# Patient Record
Sex: FEMALE | Race: WHITE
[De-identification: names, ages, dates, MRNs, and addresses within clinical notes are randomized per-mention and may not be internally consistent; named-entity substitution may affect disease eponyms.]

---

## 2022-07-15 ENCOUNTER — HOSPITAL ENCOUNTER (EMERGENCY)
Age: 52
Discharge: HOME | End: 2022-07-15
Payer: MEDICAID

## 2022-07-15 VITALS
HEART RATE: 76 BPM | RESPIRATION RATE: 17 BRPM | DIASTOLIC BLOOD PRESSURE: 96 MMHG | SYSTOLIC BLOOD PRESSURE: 194 MMHG | OXYGEN SATURATION: 98 %

## 2022-07-15 VITALS
RESPIRATION RATE: 18 BRPM | SYSTOLIC BLOOD PRESSURE: 156 MMHG | HEART RATE: 82 BPM | OXYGEN SATURATION: 100 % | DIASTOLIC BLOOD PRESSURE: 79 MMHG

## 2022-07-15 VITALS
SYSTOLIC BLOOD PRESSURE: 156 MMHG | HEART RATE: 83 BPM | RESPIRATION RATE: 15 BRPM | OXYGEN SATURATION: 98 % | DIASTOLIC BLOOD PRESSURE: 84 MMHG

## 2022-07-15 VITALS
OXYGEN SATURATION: 99 % | HEART RATE: 79 BPM | SYSTOLIC BLOOD PRESSURE: 183 MMHG | RESPIRATION RATE: 16 BRPM | DIASTOLIC BLOOD PRESSURE: 97 MMHG

## 2022-07-15 VITALS
DIASTOLIC BLOOD PRESSURE: 74 MMHG | OXYGEN SATURATION: 98 % | HEART RATE: 80 BPM | SYSTOLIC BLOOD PRESSURE: 151 MMHG | RESPIRATION RATE: 16 BRPM

## 2022-07-15 VITALS
TEMPERATURE: 97.88 F | OXYGEN SATURATION: 98 % | DIASTOLIC BLOOD PRESSURE: 74 MMHG | SYSTOLIC BLOOD PRESSURE: 145 MMHG | HEART RATE: 78 BPM | RESPIRATION RATE: 16 BRPM

## 2022-07-15 VITALS
RESPIRATION RATE: 17 BRPM | SYSTOLIC BLOOD PRESSURE: 154 MMHG | DIASTOLIC BLOOD PRESSURE: 79 MMHG | HEART RATE: 88 BPM | TEMPERATURE: 98.8 F | BODY MASS INDEX: 33.3 KG/M2 | OXYGEN SATURATION: 98 %

## 2022-07-15 DIAGNOSIS — Z79.4: ICD-10-CM

## 2022-07-15 DIAGNOSIS — Z91.030: ICD-10-CM

## 2022-07-15 DIAGNOSIS — I69.851: ICD-10-CM

## 2022-07-15 DIAGNOSIS — Z79.01: ICD-10-CM

## 2022-07-15 DIAGNOSIS — R53.1: Primary | ICD-10-CM

## 2022-07-15 DIAGNOSIS — Z79.82: ICD-10-CM

## 2022-07-15 DIAGNOSIS — Z79.899: ICD-10-CM

## 2022-07-15 DIAGNOSIS — Z88.8: ICD-10-CM

## 2022-07-15 LAB
ANION GAP SERPL CALC-SCNC: 10.5 MEQ/L (ref 5–15)
BUN SERPL-MCNC: 16 MG/DL (ref 7–17)
CALCIUM SPEC-MCNC: 9.2 MG/DL (ref 8.4–10.2)
CHLORIDE SPEC-SCNC: 98 MMOL/L (ref 98–107)
CO2 SERPL-SCNC: 32 MMOL/L (ref 22–30)
CREAT BLD-SCNC: 0.5 MG/DL (ref 0.52–1.04)
CREATININE CLEARANCE ESTIMATED: 191 ML/MIN (ref 50–200)
ESTIMATED GLOMERULAR FILT RATE: 130 ML/MIN (ref 60–?)
GFR (AFRICAN AMERICAN): 157 ML/MIN (ref 60–?)
GLUCOSE: 247 MG/DL (ref 74–100)
HCT VFR BLD CALC: 41.3 % (ref 37–47)
HGB BLD-MCNC: 13.3 G/DL (ref 12.2–16.2)
INR PPP: 0.98 (ref 0.9–1.1)
MCHC RBC-ENTMCNC: 32.3 G/DL (ref 31.8–35.4)
MCV RBC: 91.5 FL (ref 81–99)
MEAN CORPUSCULAR HEMOGLOBIN: 29.6 PG (ref 27–31.2)
PLATELET # BLD: 468 K/MM3 (ref 142–424)
POTASSIUM: 4.5 MMOL/L (ref 3.5–5.1)
PT BLD: 11.1 SECONDS (ref 10.1–12.5)
RBC # BLD AUTO: 4.51 M/MM3 (ref 4.2–5.4)
SODIUM SPEC-SCNC: 136 MMOL/L (ref 136–145)
WBC # BLD AUTO: 8.4 K/MM3 (ref 4.8–10.8)

## 2022-07-15 PROCEDURE — 70498 CT ANGIOGRAPHY NECK: CPT

## 2022-07-15 PROCEDURE — 80048 BASIC METABOLIC PNL TOTAL CA: CPT

## 2022-07-15 PROCEDURE — 70450 CT HEAD/BRAIN W/O DYE: CPT

## 2022-07-15 PROCEDURE — 85025 COMPLETE CBC W/AUTO DIFF WBC: CPT

## 2022-07-15 PROCEDURE — 85610 PROTHROMBIN TIME: CPT

## 2022-07-15 PROCEDURE — 70496 CT ANGIOGRAPHY HEAD: CPT

## 2022-07-15 PROCEDURE — 99291 CRITICAL CARE FIRST HOUR: CPT

## 2022-07-15 NOTE — PC.NURSE
1210 PT OFFERED WARM BLANKET, DECLINED. SIDE RAILS UP X2. BED IN LOWEST POSITION. CALL LIGHT WITHIN REACH

## 2022-08-18 ENCOUNTER — HOSPITAL ENCOUNTER (OUTPATIENT)
Dept: HOSPITAL 22 - PT.CARL | Age: 52
LOS: 71 days | Discharge: HOME | End: 2022-10-28
Payer: MEDICAID

## 2022-08-18 DIAGNOSIS — I69.351: Primary | ICD-10-CM

## 2022-08-18 PROCEDURE — 97163 PT EVAL HIGH COMPLEX 45 MIN: CPT

## 2022-08-18 PROCEDURE — 97110 THERAPEUTIC EXERCISES: CPT

## 2022-08-18 PROCEDURE — 97530 THERAPEUTIC ACTIVITIES: CPT

## 2022-08-18 PROCEDURE — 97112 NEUROMUSCULAR REEDUCATION: CPT

## 2022-12-19 ENCOUNTER — HOSPITAL ENCOUNTER (OUTPATIENT)
Dept: HOSPITAL 22 - PT | Age: 52
LOS: 49 days | Discharge: HOME | End: 2023-02-06
Payer: COMMERCIAL

## 2022-12-19 DIAGNOSIS — S82.841A: Primary | ICD-10-CM

## 2022-12-19 PROCEDURE — 97530 THERAPEUTIC ACTIVITIES: CPT

## 2022-12-19 PROCEDURE — 97116 GAIT TRAINING THERAPY: CPT

## 2022-12-19 PROCEDURE — 97112 NEUROMUSCULAR REEDUCATION: CPT

## 2022-12-19 PROCEDURE — 97163 PT EVAL HIGH COMPLEX 45 MIN: CPT

## 2022-12-19 PROCEDURE — 97140 MANUAL THERAPY 1/> REGIONS: CPT

## 2022-12-19 PROCEDURE — 97110 THERAPEUTIC EXERCISES: CPT

## 2023-06-05 ENCOUNTER — APPOINTMENT (OUTPATIENT)
Dept: OTHER | Facility: HOSPITAL | Age: 53
End: 2023-06-05
Payer: MEDICARE

## 2023-06-05 ENCOUNTER — APPOINTMENT (OUTPATIENT)
Dept: CARDIOLOGY | Facility: HOSPITAL | Age: 53
End: 2023-06-05
Payer: MEDICARE

## 2023-06-05 ENCOUNTER — APPOINTMENT (OUTPATIENT)
Dept: MRI IMAGING | Facility: HOSPITAL | Age: 53
End: 2023-06-05
Payer: MEDICARE

## 2023-06-05 ENCOUNTER — HOSPITAL ENCOUNTER (OUTPATIENT)
Facility: HOSPITAL | Age: 53
Setting detail: OBSERVATION
LOS: 1 days | Discharge: NURSING, REHAB OR LONG-TERM CARE | End: 2023-06-08
Attending: INTERNAL MEDICINE | Admitting: HOSPITALIST
Payer: MEDICARE

## 2023-06-05 DIAGNOSIS — I63.9 CEREBROVASCULAR ACCIDENT (CVA), UNSPECIFIED MECHANISM: Primary | ICD-10-CM

## 2023-06-05 DIAGNOSIS — E11.9 TYPE 2 DIABETES MELLITUS WITHOUT COMPLICATION, UNSPECIFIED WHETHER LONG TERM INSULIN USE: ICD-10-CM

## 2023-06-05 DIAGNOSIS — R41.841 COGNITIVE COMMUNICATION DEFICIT: ICD-10-CM

## 2023-06-05 PROBLEM — E78.5 HYPERLIPIDEMIA: Status: ACTIVE | Noted: 2023-06-05

## 2023-06-05 PROBLEM — G81.91 RIGHT HEMIPARESIS: Status: ACTIVE | Noted: 2023-06-05

## 2023-06-05 PROBLEM — Z86.73 HISTORY OF STROKE: Status: ACTIVE | Noted: 2023-06-05

## 2023-06-05 PROBLEM — R47.01 APHASIA: Status: ACTIVE | Noted: 2023-06-05

## 2023-06-05 PROBLEM — I10 HTN (HYPERTENSION): Status: ACTIVE | Noted: 2023-06-05

## 2023-06-05 LAB
ALBUMIN SERPL-MCNC: 3.6 G/DL (ref 3.5–5.2)
ALBUMIN/GLOB SERPL: 1.2 G/DL
ALP SERPL-CCNC: 138 U/L (ref 39–117)
ALT SERPL W P-5'-P-CCNC: 16 U/L (ref 1–33)
ANION GAP SERPL CALCULATED.3IONS-SCNC: 12 MMOL/L (ref 5–15)
AST SERPL-CCNC: 17 U/L (ref 1–32)
BASOPHILS # BLD AUTO: 0.05 10*3/MM3 (ref 0–0.2)
BASOPHILS NFR BLD AUTO: 0.7 % (ref 0–1.5)
BH CV ECHO MEAS - AO MAX PG: 8.2 MMHG
BH CV ECHO MEAS - AO MEAN PG: 5 MMHG
BH CV ECHO MEAS - AO ROOT DIAM: 2.9 CM
BH CV ECHO MEAS - AO V2 MAX: 143 CM/SEC
BH CV ECHO MEAS - AO V2 VTI: 35.8 CM
BH CV ECHO MEAS - AVA(I,D): 2.19 CM2
BH CV ECHO MEAS - EDV(CUBED): 54.9 ML
BH CV ECHO MEAS - EDV(MOD-SP2): 107 ML
BH CV ECHO MEAS - EDV(MOD-SP4): 97.7 ML
BH CV ECHO MEAS - EF(MOD-BP): 64 %
BH CV ECHO MEAS - EF(MOD-SP2): 60.9 %
BH CV ECHO MEAS - EF(MOD-SP4): 64.4 %
BH CV ECHO MEAS - ESV(CUBED): 17.6 ML
BH CV ECHO MEAS - ESV(MOD-SP2): 41.8 ML
BH CV ECHO MEAS - ESV(MOD-SP4): 34.8 ML
BH CV ECHO MEAS - FS: 31.6 %
BH CV ECHO MEAS - IVS/LVPW: 1 CM
BH CV ECHO MEAS - IVSD: 1 CM
BH CV ECHO MEAS - LA DIMENSION: 3.5 CM
BH CV ECHO MEAS - LAT PEAK E' VEL: 12.4 CM/SEC
BH CV ECHO MEAS - LV MASS(C)D: 117.3 GRAMS
BH CV ECHO MEAS - LV MAX PG: 4.2 MMHG
BH CV ECHO MEAS - LV MEAN PG: 2 MMHG
BH CV ECHO MEAS - LV V1 MAX: 103 CM/SEC
BH CV ECHO MEAS - LV V1 VTI: 25 CM
BH CV ECHO MEAS - LVIDD: 3.8 CM
BH CV ECHO MEAS - LVIDS: 2.6 CM
BH CV ECHO MEAS - LVOT AREA: 3.1 CM2
BH CV ECHO MEAS - LVOT DIAM: 2 CM
BH CV ECHO MEAS - LVPWD: 1 CM
BH CV ECHO MEAS - MED PEAK E' VEL: 6.6 CM/SEC
BH CV ECHO MEAS - MV A MAX VEL: 98 CM/SEC
BH CV ECHO MEAS - MV DEC TIME: 0.24 MSEC
BH CV ECHO MEAS - MV E MAX VEL: 108 CM/SEC
BH CV ECHO MEAS - MV E/A: 1.1
BH CV ECHO MEAS - MV MAX PG: 4.5 MMHG
BH CV ECHO MEAS - MV MEAN PG: 3 MMHG
BH CV ECHO MEAS - MV V2 VTI: 33 CM
BH CV ECHO MEAS - MVA(VTI): 2.38 CM2
BH CV ECHO MEAS - PA ACC TIME: 0.18 SEC
BH CV ECHO MEAS - PA PR(ACCEL): -0.2 MMHG
BH CV ECHO MEAS - PA V2 MAX: 114 CM/SEC
BH CV ECHO MEAS - SV(LVOT): 78.5 ML
BH CV ECHO MEAS - SV(MOD-SP2): 65.2 ML
BH CV ECHO MEAS - SV(MOD-SP4): 62.9 ML
BH CV ECHO MEAS - TAPSE (>1.6): 1.88 CM
BH CV ECHO MEASUREMENTS AVERAGE E/E' RATIO: 11.37
BH CV ECHO SHUNT ASSESSMENT PERFORMED (HIDDEN SCRIPTING): 1
BH CV VAS BP LEFT ARM: NORMAL MMHG
BH CV XLRA - RV BASE: 3.1 CM
BH CV XLRA - RV LENGTH: 5.9 CM
BH CV XLRA - RV MID: 2.5 CM
BH CV XLRA - TDI S': 12.7 CM/SEC
BILIRUB SERPL-MCNC: 0.3 MG/DL (ref 0–1.2)
BUN SERPL-MCNC: 15 MG/DL (ref 6–20)
BUN/CREAT SERPL: 26.3 (ref 7–25)
CALCIUM SPEC-SCNC: 9 MG/DL (ref 8.6–10.5)
CHLORIDE SERPL-SCNC: 103 MMOL/L (ref 98–107)
CHOLEST SERPL-MCNC: 138 MG/DL (ref 0–200)
CO2 SERPL-SCNC: 24 MMOL/L (ref 22–29)
CREAT SERPL-MCNC: 0.57 MG/DL (ref 0.57–1)
DEPRECATED RDW RBC AUTO: 46.8 FL (ref 37–54)
EGFRCR SERPLBLD CKD-EPI 2021: 109.5 ML/MIN/1.73
EOSINOPHIL # BLD AUTO: 0.19 10*3/MM3 (ref 0–0.4)
EOSINOPHIL NFR BLD AUTO: 2.5 % (ref 0.3–6.2)
ERYTHROCYTE [DISTWIDTH] IN BLOOD BY AUTOMATED COUNT: 13.7 % (ref 12.3–15.4)
GLOBULIN UR ELPH-MCNC: 3 GM/DL
GLUCOSE BLDC GLUCOMTR-MCNC: 121 MG/DL (ref 70–130)
GLUCOSE BLDC GLUCOMTR-MCNC: 129 MG/DL (ref 70–130)
GLUCOSE BLDC GLUCOMTR-MCNC: 145 MG/DL (ref 70–130)
GLUCOSE BLDC GLUCOMTR-MCNC: 154 MG/DL (ref 70–130)
GLUCOSE SERPL-MCNC: 118 MG/DL (ref 65–99)
HBA1C MFR BLD: 10.8 % (ref 4.8–5.6)
HCT VFR BLD AUTO: 36.6 % (ref 34–46.6)
HDLC SERPL-MCNC: 62 MG/DL (ref 40–60)
HGB BLD-MCNC: 11.6 G/DL (ref 12–15.9)
IMM GRANULOCYTES # BLD AUTO: 0.02 10*3/MM3 (ref 0–0.05)
IMM GRANULOCYTES NFR BLD AUTO: 0.3 % (ref 0–0.5)
LDLC SERPL CALC-MCNC: 62 MG/DL (ref 0–100)
LDLC/HDLC SERPL: 1.01 {RATIO}
LEFT ATRIUM VOLUME INDEX: 32.2 ML/M2
LYMPHOCYTES # BLD AUTO: 2.97 10*3/MM3 (ref 0.7–3.1)
LYMPHOCYTES NFR BLD AUTO: 39.4 % (ref 19.6–45.3)
MAGNESIUM SERPL-MCNC: 1.9 MG/DL (ref 1.6–2.6)
MCH RBC QN AUTO: 29.1 PG (ref 26.6–33)
MCHC RBC AUTO-ENTMCNC: 31.7 G/DL (ref 31.5–35.7)
MCV RBC AUTO: 92 FL (ref 79–97)
MONOCYTES # BLD AUTO: 0.58 10*3/MM3 (ref 0.1–0.9)
MONOCYTES NFR BLD AUTO: 7.7 % (ref 5–12)
NEUTROPHILS NFR BLD AUTO: 3.73 10*3/MM3 (ref 1.7–7)
NEUTROPHILS NFR BLD AUTO: 49.4 % (ref 42.7–76)
NRBC BLD AUTO-RTO: 0 /100 WBC (ref 0–0.2)
PA ADP PRP-ACNC: 17 PRU
PLATELET # BLD AUTO: 318 10*3/MM3 (ref 140–450)
PMV BLD AUTO: 10.2 FL (ref 6–12)
POTASSIUM SERPL-SCNC: 3 MMOL/L (ref 3.5–5.2)
POTASSIUM SERPL-SCNC: 4.4 MMOL/L (ref 3.5–5.2)
PROT SERPL-MCNC: 6.6 G/DL (ref 6–8.5)
RBC # BLD AUTO: 3.98 10*6/MM3 (ref 3.77–5.28)
SODIUM SERPL-SCNC: 139 MMOL/L (ref 136–145)
TRIGL SERPL-MCNC: 67 MG/DL (ref 0–150)
VLDLC SERPL-MCNC: 14 MG/DL (ref 5–40)
WBC NRBC COR # BLD: 7.54 10*3/MM3 (ref 3.4–10.8)

## 2023-06-05 PROCEDURE — 85025 COMPLETE CBC W/AUTO DIFF WBC: CPT | Performed by: INTERNAL MEDICINE

## 2023-06-05 PROCEDURE — G0378 HOSPITAL OBSERVATION PER HR: HCPCS

## 2023-06-05 PROCEDURE — 99214 OFFICE O/P EST MOD 30 MIN: CPT | Performed by: NURSE PRACTITIONER

## 2023-06-05 PROCEDURE — 84132 ASSAY OF SERUM POTASSIUM: CPT | Performed by: INTERNAL MEDICINE

## 2023-06-05 PROCEDURE — 83036 HEMOGLOBIN GLYCOSYLATED A1C: CPT | Performed by: INTERNAL MEDICINE

## 2023-06-05 PROCEDURE — 97530 THERAPEUTIC ACTIVITIES: CPT

## 2023-06-05 PROCEDURE — 96372 THER/PROPH/DIAG INJ SC/IM: CPT

## 2023-06-05 PROCEDURE — 92523 SPEECH SOUND LANG COMPREHEN: CPT

## 2023-06-05 PROCEDURE — 25010000002 ENOXAPARIN PER 10 MG: Performed by: INTERNAL MEDICINE

## 2023-06-05 PROCEDURE — 97535 SELF CARE MNGMENT TRAINING: CPT

## 2023-06-05 PROCEDURE — 97116 GAIT TRAINING THERAPY: CPT

## 2023-06-05 PROCEDURE — 70551 MRI BRAIN STEM W/O DYE: CPT

## 2023-06-05 PROCEDURE — 93306 TTE W/DOPPLER COMPLETE: CPT

## 2023-06-05 PROCEDURE — 92610 EVALUATE SWALLOWING FUNCTION: CPT

## 2023-06-05 PROCEDURE — 80061 LIPID PANEL: CPT | Performed by: INTERNAL MEDICINE

## 2023-06-05 PROCEDURE — 83735 ASSAY OF MAGNESIUM: CPT | Performed by: INTERNAL MEDICINE

## 2023-06-05 PROCEDURE — 82948 REAGENT STRIP/BLOOD GLUCOSE: CPT

## 2023-06-05 PROCEDURE — G0379 DIRECT REFER HOSPITAL OBSERV: HCPCS

## 2023-06-05 PROCEDURE — 80053 COMPREHEN METABOLIC PANEL: CPT | Performed by: INTERNAL MEDICINE

## 2023-06-05 PROCEDURE — 96365 THER/PROPH/DIAG IV INF INIT: CPT

## 2023-06-05 PROCEDURE — 97162 PT EVAL MOD COMPLEX 30 MIN: CPT

## 2023-06-05 PROCEDURE — 85576 BLOOD PLATELET AGGREGATION: CPT | Performed by: INTERNAL MEDICINE

## 2023-06-05 PROCEDURE — 63710000001 INSULIN LISPRO (HUMAN) PER 5 UNITS: Performed by: INTERNAL MEDICINE

## 2023-06-05 PROCEDURE — 97165 OT EVAL LOW COMPLEX 30 MIN: CPT

## 2023-06-05 PROCEDURE — 63710000001 INSULIN DETEMIR PER 5 UNITS: Performed by: INTERNAL MEDICINE

## 2023-06-05 PROCEDURE — 99223 1ST HOSP IP/OBS HIGH 75: CPT | Performed by: INTERNAL MEDICINE

## 2023-06-05 PROCEDURE — 93306 TTE W/DOPPLER COMPLETE: CPT | Performed by: INTERNAL MEDICINE

## 2023-06-05 RX ORDER — AMOXICILLIN 250 MG
2 CAPSULE ORAL 2 TIMES DAILY
Status: DISCONTINUED | OUTPATIENT
Start: 2023-06-05 | End: 2023-06-08 | Stop reason: HOSPADM

## 2023-06-05 RX ORDER — PREGABALIN 150 MG/1
300 CAPSULE ORAL NIGHTLY PRN
Status: DISCONTINUED | OUTPATIENT
Start: 2023-06-05 | End: 2023-06-08 | Stop reason: HOSPADM

## 2023-06-05 RX ORDER — ONDANSETRON 2 MG/ML
4 INJECTION INTRAMUSCULAR; INTRAVENOUS EVERY 6 HOURS PRN
Status: DISCONTINUED | OUTPATIENT
Start: 2023-06-05 | End: 2023-06-08 | Stop reason: HOSPADM

## 2023-06-05 RX ORDER — NITROGLYCERIN 0.4 MG/1
0.4 TABLET SUBLINGUAL
Status: DISCONTINUED | OUTPATIENT
Start: 2023-06-05 | End: 2023-06-05

## 2023-06-05 RX ORDER — OMEPRAZOLE 20 MG/1
20 CAPSULE, DELAYED RELEASE ORAL DAILY
COMMUNITY
End: 2023-06-08 | Stop reason: HOSPADM

## 2023-06-05 RX ORDER — BUPROPION HYDROCHLORIDE 150 MG/1
150 TABLET ORAL DAILY
Status: DISCONTINUED | OUTPATIENT
Start: 2023-06-05 | End: 2023-06-08 | Stop reason: HOSPADM

## 2023-06-05 RX ORDER — BUPROPION HYDROCHLORIDE 150 MG/1
150 TABLET ORAL DAILY
COMMUNITY

## 2023-06-05 RX ORDER — PREGABALIN 100 MG/1
300 CAPSULE ORAL
Status: ON HOLD | COMMUNITY
End: 2023-06-08 | Stop reason: SDUPTHER

## 2023-06-05 RX ORDER — SODIUM CHLORIDE 0.9 % (FLUSH) 0.9 %
10 SYRINGE (ML) INJECTION AS NEEDED
Status: DISCONTINUED | OUTPATIENT
Start: 2023-06-05 | End: 2023-06-08 | Stop reason: HOSPADM

## 2023-06-05 RX ORDER — IBUPROFEN 600 MG/1
1 TABLET ORAL
Status: DISCONTINUED | OUTPATIENT
Start: 2023-06-05 | End: 2023-06-08 | Stop reason: HOSPADM

## 2023-06-05 RX ORDER — SODIUM CHLORIDE 0.9 % (FLUSH) 0.9 %
10 SYRINGE (ML) INJECTION EVERY 12 HOURS SCHEDULED
Status: DISCONTINUED | OUTPATIENT
Start: 2023-06-05 | End: 2023-06-08 | Stop reason: HOSPADM

## 2023-06-05 RX ORDER — CLOPIDOGREL BISULFATE 75 MG/1
75 TABLET ORAL DAILY
COMMUNITY
End: 2023-06-08 | Stop reason: HOSPADM

## 2023-06-05 RX ORDER — POTASSIUM CHLORIDE 1.5 G/1.77G
40 POWDER, FOR SOLUTION ORAL EVERY 4 HOURS
Status: COMPLETED | OUTPATIENT
Start: 2023-06-05 | End: 2023-06-05

## 2023-06-05 RX ORDER — ASPIRIN 300 MG/1
300 SUPPOSITORY RECTAL DAILY
Status: DISCONTINUED | OUTPATIENT
Start: 2023-06-05 | End: 2023-06-06 | Stop reason: ALTCHOICE

## 2023-06-05 RX ORDER — NICOTINE POLACRILEX 4 MG
15 LOZENGE BUCCAL
Status: DISCONTINUED | OUTPATIENT
Start: 2023-06-05 | End: 2023-06-08 | Stop reason: HOSPADM

## 2023-06-05 RX ORDER — INSULIN DEGLUDEC 100 U/ML
60 INJECTION, SOLUTION SUBCUTANEOUS
COMMUNITY

## 2023-06-05 RX ORDER — BISACODYL 5 MG/1
5 TABLET, DELAYED RELEASE ORAL DAILY PRN
Status: DISCONTINUED | OUTPATIENT
Start: 2023-06-05 | End: 2023-06-08 | Stop reason: HOSPADM

## 2023-06-05 RX ORDER — PANTOPRAZOLE SODIUM 40 MG/1
40 TABLET, DELAYED RELEASE ORAL
Status: DISCONTINUED | OUTPATIENT
Start: 2023-06-05 | End: 2023-06-08 | Stop reason: HOSPADM

## 2023-06-05 RX ORDER — ASPIRIN 81 MG/1
81 TABLET, CHEWABLE ORAL DAILY
Status: DISCONTINUED | OUTPATIENT
Start: 2023-06-05 | End: 2023-06-08 | Stop reason: HOSPADM

## 2023-06-05 RX ORDER — ASPIRIN 81 MG/1
81 TABLET, CHEWABLE ORAL DAILY
Status: DISCONTINUED | OUTPATIENT
Start: 2023-06-05 | End: 2023-06-05

## 2023-06-05 RX ORDER — FLUOXETINE HYDROCHLORIDE 20 MG/1
40 CAPSULE ORAL DAILY
COMMUNITY
End: 2023-06-08 | Stop reason: HOSPADM

## 2023-06-05 RX ORDER — BISACODYL 10 MG
10 SUPPOSITORY, RECTAL RECTAL DAILY PRN
Status: DISCONTINUED | OUTPATIENT
Start: 2023-06-05 | End: 2023-06-08 | Stop reason: HOSPADM

## 2023-06-05 RX ORDER — CYCLOBENZAPRINE HCL 10 MG
10 TABLET ORAL DAILY PRN
Status: DISCONTINUED | OUTPATIENT
Start: 2023-06-05 | End: 2023-06-08 | Stop reason: HOSPADM

## 2023-06-05 RX ORDER — SODIUM CHLORIDE 9 MG/ML
40 INJECTION, SOLUTION INTRAVENOUS AS NEEDED
Status: DISCONTINUED | OUTPATIENT
Start: 2023-06-05 | End: 2023-06-08 | Stop reason: HOSPADM

## 2023-06-05 RX ORDER — ATORVASTATIN CALCIUM 80 MG/1
80 TABLET, FILM COATED ORAL DAILY
COMMUNITY

## 2023-06-05 RX ORDER — ASPIRIN 325 MG
TABLET ORAL
Status: DISCONTINUED
Start: 2023-06-05 | End: 2023-06-08 | Stop reason: HOSPADM

## 2023-06-05 RX ORDER — ASPIRIN 81 MG/1
81 TABLET, CHEWABLE ORAL DAILY
Status: ON HOLD | COMMUNITY
End: 2023-06-08 | Stop reason: SDUPTHER

## 2023-06-05 RX ORDER — SODIUM CHLORIDE 9 MG/ML
40 INJECTION, SOLUTION INTRAVENOUS AS NEEDED
Status: DISCONTINUED | OUTPATIENT
Start: 2023-06-05 | End: 2023-06-05

## 2023-06-05 RX ORDER — LOSARTAN POTASSIUM 50 MG/1
100 TABLET ORAL DAILY
COMMUNITY

## 2023-06-05 RX ORDER — POLYETHYLENE GLYCOL 3350 17 G/17G
17 POWDER, FOR SOLUTION ORAL DAILY PRN
Status: DISCONTINUED | OUTPATIENT
Start: 2023-06-05 | End: 2023-06-08 | Stop reason: HOSPADM

## 2023-06-05 RX ORDER — CLOPIDOGREL BISULFATE 75 MG/1
75 TABLET ORAL DAILY
Status: DISCONTINUED | OUTPATIENT
Start: 2023-06-05 | End: 2023-06-05

## 2023-06-05 RX ORDER — INSULIN LISPRO 100 [IU]/ML
2-9 INJECTION, SOLUTION INTRAVENOUS; SUBCUTANEOUS
Status: DISCONTINUED | OUTPATIENT
Start: 2023-06-05 | End: 2023-06-08 | Stop reason: HOSPADM

## 2023-06-05 RX ORDER — ATORVASTATIN CALCIUM 40 MG/1
80 TABLET, FILM COATED ORAL NIGHTLY
Status: DISCONTINUED | OUTPATIENT
Start: 2023-06-05 | End: 2023-06-08 | Stop reason: HOSPADM

## 2023-06-05 RX ORDER — ONDANSETRON 4 MG/1
4 TABLET, FILM COATED ORAL EVERY 6 HOURS PRN
COMMUNITY

## 2023-06-05 RX ORDER — CYCLOBENZAPRINE HCL 10 MG
10 TABLET ORAL EVERY MORNING
Status: ON HOLD | COMMUNITY
End: 2023-06-08 | Stop reason: SDUPTHER

## 2023-06-05 RX ORDER — SODIUM CHLORIDE 0.9 % (FLUSH) 0.9 %
10 SYRINGE (ML) INJECTION AS NEEDED
Status: DISCONTINUED | OUTPATIENT
Start: 2023-06-05 | End: 2023-06-05

## 2023-06-05 RX ORDER — NITROGLYCERIN 0.4 MG/1
0.4 TABLET SUBLINGUAL
Status: CANCELLED | OUTPATIENT
Start: 2023-06-05

## 2023-06-05 RX ORDER — ASPIRIN 325 MG
325 TABLET ORAL DAILY
Status: DISCONTINUED | OUTPATIENT
Start: 2023-06-05 | End: 2023-06-05

## 2023-06-05 RX ORDER — SODIUM CHLORIDE 0.9 % (FLUSH) 0.9 %
10 SYRINGE (ML) INJECTION EVERY 12 HOURS SCHEDULED
Status: DISCONTINUED | OUTPATIENT
Start: 2023-06-05 | End: 2023-06-05

## 2023-06-05 RX ORDER — ENOXAPARIN SODIUM 100 MG/ML
40 INJECTION SUBCUTANEOUS DAILY
Status: DISCONTINUED | OUTPATIENT
Start: 2023-06-05 | End: 2023-06-08 | Stop reason: HOSPADM

## 2023-06-05 RX ORDER — DEXTROSE MONOHYDRATE 25 G/50ML
25 INJECTION, SOLUTION INTRAVENOUS
Status: DISCONTINUED | OUTPATIENT
Start: 2023-06-05 | End: 2023-06-08 | Stop reason: HOSPADM

## 2023-06-05 RX ADMIN — POTASSIUM CHLORIDE 40 MEQ: 1.5 POWDER, FOR SOLUTION ORAL at 10:13

## 2023-06-05 RX ADMIN — ATORVASTATIN CALCIUM 80 MG: 40 TABLET, FILM COATED ORAL at 22:02

## 2023-06-05 RX ADMIN — ASPIRIN 81 MG CHEWABLE TABLET 81 MG: 81 TABLET CHEWABLE at 15:43

## 2023-06-05 RX ADMIN — ENOXAPARIN SODIUM 40 MG: 100 INJECTION SUBCUTANEOUS at 09:34

## 2023-06-05 RX ADMIN — TICAGRELOR 60 MG: 60 TABLET ORAL at 22:07

## 2023-06-05 RX ADMIN — POTASSIUM CHLORIDE 40 MEQ: 1.5 POWDER, FOR SOLUTION ORAL at 17:35

## 2023-06-05 RX ADMIN — BUPROPION HYDROCHLORIDE 150 MG: 150 TABLET, EXTENDED RELEASE ORAL at 09:34

## 2023-06-05 RX ADMIN — NICARDIPINE HYDROCHLORIDE 5 MG/HR: 25 INJECTION, SOLUTION INTRAVENOUS at 09:06

## 2023-06-05 RX ADMIN — POTASSIUM CHLORIDE 40 MEQ: 1.5 POWDER, FOR SOLUTION ORAL at 14:10

## 2023-06-05 RX ADMIN — Medication 10 ML: at 09:35

## 2023-06-05 RX ADMIN — INSULIN LISPRO 2 UNITS: 100 INJECTION, SOLUTION INTRAVENOUS; SUBCUTANEOUS at 17:34

## 2023-06-05 RX ADMIN — PANTOPRAZOLE SODIUM 40 MG: 40 TABLET, DELAYED RELEASE ORAL at 09:34

## 2023-06-05 RX ADMIN — INSULIN DETEMIR 10 UNITS: 100 INJECTION, SOLUTION SUBCUTANEOUS at 09:35

## 2023-06-05 RX ADMIN — EMPAGLIFLOZIN 10 MG: 10 TABLET, FILM COATED ORAL at 09:34

## 2023-06-05 RX ADMIN — SENNOSIDES AND DOCUSATE SODIUM 2 TABLET: 50; 8.6 TABLET ORAL at 22:02

## 2023-06-05 NOTE — PLAN OF CARE
Goal Outcome Evaluation:  Plan of Care Reviewed With: patient           Outcome Evaluation: PT IS  S/P NEWLY DIAGNOSED SA CVA  AND PRESENTS WITH EVOLVING SYMPTOMS TO INCLUDE INCREASED R SIDED WEAKNESS, IMPAIRED BALANCE, IMPAIRED COORDINATION AND DECLINE IN FUNCTIONAL MOBILITY. PT CURRENTLY REQUIRES MOD ASSIST OF 2 VIA B UE SUPPORT TO AMBULATE 28 FEET. PT IS A&O AND FOLLOWS ALL COMMANDS. RECOMMEND IRF FOR BEST OUTCOME. PT IS HIGHLY MOTIVATED TO WORK WITH THERAPY AND ENDORSES GOAL TO RETURN TO INDEPENDENT AMBULATION WITH CHERELLE WX.

## 2023-06-05 NOTE — CONSULTS
Diabetes Education  Assessment/Teaching    Patient Name:  Alma Mederos  YOB: 1970  MRN: [id number]  Admit Date:  6/5/2023      Assessment Date:  6/5/2023    Chart reviewed for diabetes ed opportunity. Noted most recent GCS is 11. Will cont to follow and reassess for appropriate time for education.              Electronically signed by:  Sobia Bridges RN  06/05/23 10:46 EDT

## 2023-06-05 NOTE — THERAPY EVALUATION
Acute Care - Speech Language Pathology Initial Evaluation  [facility]  Cognitive-Communication Evaluation  & Clinical Swallow Evaluation     Patient Name: Alma Mederos  : 1970  MRN: [id number]  Today's Date: 2023               Admit Date: 2023     Visit Dx:    ICD-10-CM ICD-9-CM   1. Cerebrovascular accident (CVA), unspecified mechanism  I63.9 434.91   2. Cognitive communication deficit  R41.841 799.52     Patient Active Problem List   Diagnosis    Right hemiparesis    Aphasia    History of stroke    CVA (cerebral vascular accident)    DM (diabetes mellitus)    HTN (hypertension)    Hyperlipidemia    Stroke     Past Medical History:   Diagnosis Date    Diabetes mellitus     Hypertension     Stroke      Past Surgical History:   Procedure Laterality Date    APPENDECTOMY         SLP Recommendation and Plan  SLP Diagnosis: mild, cognitive-linguistic disorder (23)           Swallow Criteria for Skilled Therapeutic Interventions Met: no problems identified which require skilled intervention (23 1000)  SLC Criteria for Skilled Therapy Interventions Met: yes (23)  Anticipated Discharge Disposition (SLP): anticipate therapy at next level of care, home with home health, home with assist (assist w/ medication/finance mgt) (23)        Predicted Duration Therapy Intervention (Days): until discharge (23)                          Plan of Care Reviewed With: patient (23)  Progress: no change (23)      SLP EVALUATION (last 72 hours)       SLP SLC Evaluation       Row Name 23                   Communication Assessment/Intervention    Document Type evaluation  -AC        Subjective Information no complaints  -AC        Patient Observations alert;cooperative  -AC        Patient/Family/Caregiver Comments/Observations No family present.  -AC        Patient Effort good  -AC           General Information    Patient Profile Reviewed yes   -AC        Pertinent History Of Current Problem Adm when developed worsened R hemiparesis, aphasia. Hx L thalamic CVA ~1yr ago w/ residual R deficits. Saw SLP for dysarthria tx, but resolved. Now reported acute word finding difficulty. MRI: possible subacute L corona radiata infarct. Hx L retinal detachment r/t DM2.  -AC        Precautions/Limitations, Vision vision impairment, left;other (see comments)  uses larger print/magnifying glass  -AC        Precautions/Limitations, Hearing WFL;for purposes of eval  -AC        Patient Level of Education Some college  -        Prior Level of Function-Communication WFL  -AC        Plans/Goals Discussed with patient;agreed upon  -AC        Barriers to Rehab none identified  -        Patient's Goals for Discharge return to all previous roles/activities;functional communication  -AC           Pain    Additional Documentation Pain Scale: Numbers Pre/Post-Treatment (Group)  -           Pain Scale: Numbers Pre/Post-Treatment    Pretreatment Pain Rating 0/10 - no pain  -AC        Posttreatment Pain Rating 0/10 - no pain  -AC           Comprehension Assessment/Intervention    Comprehension Assessment/Intervention Auditory Comprehension;Reading Comprehension  -AC           Auditory Comprehension Assessment/Intervention    Auditory Comprehension (Communication) WFL  -AC        Answers Questions (Communication) WFL;complex;yes/no  -AC        Able to Follow Commands (Communication) WFL;3-step  -AC        Narrative Discourse WFL;conversational level  -AC           Reading Comprehension Assessment/Intervention    Reading Comprehension (Communication) WFL  -AC        Phrase Level WFL  -AC           Expression Assessment/Intervention    Expression Assessment/Intervention verbal expression;graphic expression  -AC           Verbal Expression Assessment/Intervention    Verbal Expression WFL  -AC        Automatic Speech (Communication) WFL;response to greeting;months of year  -AC         Repetition WFL;sentences  -AC        Phrase Completion WFL;unpredictable  -AC        Responsive Naming WFL;simple  -AC        Confrontational Naming WFL;low frequency  -AC        Conversational Discourse/Fluency WFL  -AC           Graphic Expression Assessment/Intervention    Graphic Expression WFL;non-dominant hand;other (see comments)  typing on smart phone  -AC        Sentence Formulation WFL;complex  -AC           Oral Motor Structure and Function    Dentition Assessment missing teeth  -AC        Mucosal Quality dry  -AC           Oral Musculature and Cranial Nerve Assessment    Oral Motor General Assessment oral labial or buccal impairment  -AC        Oral Labial or Buccal Impairment, Detail, Cranial Nerve VII (Facial): right labial droop;other (see comments)  slight  -AC           Motor Speech Assessment/Intervention    Motor Speech Function WFL;unfamiliar listener  -AC        Conversational Speech (Communication) WFL;simple  -AC        Speech intelligibility 100%;in quiet environment;in connected speech;with unfamiliar listener  -AC           Cognitive Assessment Intervention- SLP    Cognitive Function (Cognition) mild impairment  -        Orientation Status (Cognition) WFL;person;place;time;situation  -        Memory (Cognitive) mild impairment;short-term;delayed;unrelated;other (see comments)  immediate: 5/5 words, 7-min delay: 2/5 words (5/5 w/ min cue--close to baseline per pt)  -AC        Attention (Cognitive) WFL;sustained;divided  -AC        Thought Organization (Cognitive) mild impairment;abstract divergent  -        Reasoning (Cognitive) WFL;analogies;deductive  -AC        Problem Solving (Cognitive) WFL;temporal  -AC        Functional Math (Cognitive) mild impairment;money calculation;word problems  -           SLP Evaluation Clinical Impressions    SLP Diagnosis mild;cognitive-linguistic disorder  -AC        Rehab Potential/Prognosis good  -[facility] Criteria for Skilled Therapy  Interventions Met yes  -AC        Functional Impact difficulty completing home management task  -AC           Recommendations    Therapy Frequency (SLP SLC) 5 days per week  -AC        Predicted Duration Therapy Intervention (Days) until discharge  -AC        Anticipated Discharge Disposition (SLP) anticipate therapy at next level of care;home with home health;home with assist  assist w/ medication/finance mgt  -AC                  User Key  (r) = Recorded By, (t) = Taken By, (c) = Cosigned By      Initials Name Effective Dates    Charleen Courtney MS CCC-SLP 02/03/23 -                        EDUCATION  The patient has been educated in the following areas:     Cognitive Impairment Communication Impairment.           SLP GOALS       Row Name 06/05/23 0945             Patient will demonstrate functional cognitive-linguistic skills for return to discharge environment    [place] Independently  -AC      Time frame by discharge  -AC         Memory Skills Goal 1 (SLP)    Improve Memory Skills Through Goal 1 (SLP) recalling unrelated word lists with an imposed delay;use memory strategies;80%;with minimal cues (75-90%)  -AC      Time Frame (Memory Skills Goal 1, SLP) short term goal (STG)  -AC         Organizational Skills Goal 1 (SLP)    Improve Thought Organization Through Goal 1 (SLP) completing a divergent naming task;abstract;90%;with minimal cues (75-90%)  -AC      Time Frame (Thought Organization Skills Goal 1, SLP) short term goal (STG)  -AC         Functional Math Skills Goal 1 (SLP)    Improve Functional Math Skills Through Goal 1 (SLP) complete word problems involving time;complete word problems involving money;90%;with minimal cues (75-90%)  -AC      Time Frame (Functional Math Skills Goal 1, SLP) short term goal (STG)  -AC                User Key  (r) = Recorded By, (t) = Taken By, (c) = Cosigned By      Initials Name Provider Type    Charleen Courtney MS CCC-SLP Speech and Language Pathologist                             Time Calculation:      Time Calculation- SLP       Row Name 23 1116             Time Calculation- SLP    SLP Start Time 0945  -AC      SLP Received On 23  -AC         Untimed Charges    53601-VI Eval Speech and Production w/ Language Minutes 40  -AC      77358-ZZ Eval Oral Pharyng Swallow Minutes 35  -AC         Total Minutes    Untimed Charges Total Minutes 75  -AC       Total Minutes 75  -AC                User Key  (r) = Recorded By, (t) = Taken By, (c) = Cosigned By      Initials Name Provider Type    AC Charleen Ball, MS CCC-SLP Speech and Language Pathologist                    Therapy Charges for Today       Code Description Service Date Service Provider Modifiers Qty    44656398979 HC ST EVAL ORAL PHARYNG SWALLOW 2 2023 Charleen Ball, MS CCC-SLP GN 1    93189655217 HC ST EVAL SPEECH AND PROD W LANG  3 2023 Charleen Ball, MS CCC-SLP GN 1                       Charleen Ball MS CCC-SLP  2023   and Acute Care - Speech Language Pathology   Swallow Initial Evaluation [facility]     Patient Name: Alma Mederos  : 1970  MRN: [id number]  Today's Date: 2023               Admit Date: 2023    Visit Dx:     ICD-10-CM ICD-9-CM   1. Cerebrovascular accident (CVA), unspecified mechanism  I63.9 434.91   2. Cognitive communication deficit  R41.841 799.52     Patient Active Problem List   Diagnosis    Right hemiparesis    Aphasia    History of stroke    CVA (cerebral vascular accident)    DM (diabetes mellitus)    HTN (hypertension)    Hyperlipidemia    Stroke     Past Medical History:   Diagnosis Date    Diabetes mellitus     Hypertension     Stroke      Past Surgical History:   Procedure Laterality Date    APPENDECTOMY         SLP Recommendation and Plan  SLP Swallowing Diagnosis: swallow WFL/no suspected pharyngeal impairment (23 1000)  SLP Diet Recommendation: soft to chew textures, whole, thin liquids, other (see comments) (softer tray for improved  ease w/ self-feeding (cuts food w/ fork, rather than knife, due to R hand weakness)) (06/05/23 1000)  Recommended Precautions and Strategies: upright posture during/after eating, general aspiration precautions, other (see comments) (assist w/ tray set-up) (06/05/23 1000)  SLP Rec. for Method of Medication Administration: meds whole, with thin liquids, as tolerated (06/05/23 1000)           Swallow Criteria for Skilled Therapeutic Interventions Met: no problems identified which require skilled intervention (06/05/23 1000)  Anticipated Discharge Disposition (SLP): anticipate therapy at next level of care, home with home health, home with assist (assist w/ medication/finance mgt) (06/05/23 0945)        Predicted Duration Therapy Intervention (Days): until discharge (06/05/23 0945)                                        Plan of Care Reviewed With: patient  Progress: no change      SWALLOW EVALUATION (last 72 hours)       SLP Adult Swallow Evaluation       Row Name 06/05/23 1000                   Rehab Evaluation    Document Type evaluation  -AC        Patient Effort good  -AC           General Information    Patient Profile Reviewed yes  -AC        Current Method of Nutrition NPO  -AC        Prior Level of Function-Swallowing no diet consistency restrictions  -AC        Plans/Goals Discussed with patient;agreed upon  -AC        Barriers to Rehab none identified  -AC        Patient's Goals for Discharge return to PO diet  -AC           Pain Scale: Numbers Pre/Post-Treatment    Pretreatment Pain Rating 0/10 - no pain  -AC        Posttreatment Pain Rating 0/10 - no pain  -AC           Oral Motor Structure and Function    Secretion Management WNL/WFL  -AC        Volitional Cough WFL  -AC           General Eating/Swallowing Observations    Respiratory Support Currently in Use room air  -AC        Eating/Swallowing Skills fed by SLP;self-fed;needed assist;other (see comments)  needed assist w/ set-up 2' limited use of R hand   -        Positioning During Eating upright 90 degree;upright in bed  -        Utensils Used spoon;cup;straw  -        Consistencies Trialed thin liquids;pureed;regular textures  -           Clinical Swallow Eval    Oral Prep Phase WFL  -AC        Oral Transit WFL  -AC        Oral Residue WFL  -AC        Pharyngeal Phase no overt signs/symptoms of pharyngeal impairment  -        Clinical Swallow Evaluation Summary No overt clinical s/sxs oropharyngeal dysphagia, even when pt pushed w/ multiple trials & 3oz H2O test.  -           SLP Evaluation Clinical Impression    SLP Swallowing Diagnosis swallow WFL/no suspected pharyngeal impairment  -        Swallow Criteria for Skilled Therapeutic Interventions Met no problems identified which require skilled intervention  -           Recommendations    SLP Diet Recommendation soft to chew textures;whole;thin liquids;other (see comments)  softer tray for improved ease w/ self-feeding (cuts food w/ fork, rather than knife, due to R hand weakness)  -        Recommended Precautions and Strategies upright posture during/after eating;general aspiration precautions;other (see comments)  assist w/ tray set-up  -        Oral Care Recommendations Oral Care BID/PRN  -        SLP Rec. for Method of Medication Administration meds whole;with thin liquids;as tolerated  -                  User Key  (r) = Recorded By, (t) = Taken By, (c) = Cosigned By      Initials Name Effective Dates     Charleen Ball, MS CCC-SLP 02/03/23 -                     EDUCATION  The patient has been educated in the following areas:   Dysphagia (Swallowing Impairment).        SLP GOALS       Row Name 06/05/23 9325             Patient will demonstrate functional cognitive-linguistic skills for return to discharge environment    [place] Independ[facility]  -      Time frame by discharge  -         Memory Skills Goal 1 (SLP)    Improve Memory Skills Through Goal 1 (SLP) recalling unrelated  word lists with an imposed delay;use memory strategies;80%;with minimal cues (75-90%)  -AC      Time Frame (Memory Skills Goal 1, SLP) short term goal (STG)  -AC         Organizational Skills Goal 1 (SLP)    Improve Thought Organization Through Goal 1 (SLP) completing a divergent naming task;abstract;90%;with minimal cues (75-90%)  -AC      Time Frame (Thought Organization Skills Goal 1, SLP) short term goal (STG)  -AC         Functional Math Skills Goal 1 (SLP)    Improve Functional Math Skills Through Goal 1 (SLP) complete word problems involving time;complete word problems involving money;90%;with minimal cues (75-90%)  -AC      Time Frame (Functional Math Skills Goal 1, SLP) short term goal (STG)  -AC                User Key  (r) = Recorded By, (t) = Taken By, (c) = Cosigned By      Initials Name Provider Type    Charleen Courtney MS CCC-SLP Speech and Language Pathologist                       Time Calculation:    Time Calculation- SLP       Row Name 06/05/23 1116             Time Calculation- SLP    SLP Start Time 0945  -AC      SLP Received On 06/05/23  -AC         Untimed Charges    87110-CI Eval Speech and Production w/ Language Minutes 40  -AC      08509-US Eval Oral Pharyng Swallow Minutes 35  -AC         Total Minutes    Untimed Charges Total Minutes 75  -AC       Total Minutes 75  -AC                User Key  (r) = Recorded By, (t) = Taken By, (c) = Cosigned By      Initials Name Provider Type    Charleen Courtney MS CCC-SLP Speech and Language Pathologist                    Therapy Charges for Today       Code Description Service Date Service Provider Modifiers Qty    53517918290 HC ST EVAL ORAL PHARYNG SWALLOW 2 6/5/2023 Charleen Ball MS CCC-SLP GN 1    [id number] HC ST EVAL SPEECH AND PROD W LANG  3 6/5/2023 Charleen Ball MS CCC-SLP GN 1                 Charleen Ball MS CCC-RUTH ANN  6/5/2023

## 2023-06-05 NOTE — NURSING NOTE
"  ACC REVIEW REPORT: [facility]        PATIENT NAME: Alma Mederos    PATIENT ID: [id number]      COVID-19 ACC SCREENING       DOES THE PATIENT HAVE A FEVER GREATER THAN OR EQUAL .4: N    IS THE PATIENT EXPERIENCING SHORTNESS OF BREATH: N    DOES THE PATIENT HAVE A COUGH: N    DOES THE PATIENT HAVE ANY OF THE FOLLOWING RISK FACTORS:    EXPOSURE TO SUSPECTED OR KNOWN COVID-19: N    RECENT TRAVEL HISTORY TO ENDEMIC AREA (DOMESTIC/LOCAL): N    IS THE PATIENT A HEALTHCARE WORKER: N    HAS THE PATIENT EXPERIENCED A LOSS OF SENSE OF TASTE OR SMELL:  N    HAS THE PATIENT BEEN TESTED FOR COVID-19: N    DATE TESTED:     LAB TESTING SENT TO:       BED: S305    BED TYPE: TELE    BED GIVEN TO: ZENA NAYLOR RN    TIME BED GIVEN: 0306    TODAY'S DATE: 6/5/2023    TRANSFER DATE: 6/5/23    ETA: WILL CALL WHEN PT LEAVES [facility]    TRANSFERRING FACILITY: [facility]    TRANSFERRING FACILITY PHONE # : [phone]    TRANSFERRING MD: ANDREA    ACCEPTING PROVIDER: MD    NEUROLOGY PHYSICIAN: ELIDA    DATE/TIME REQUEST RECEIVED: 6/5/23 @0231    [facility] RN: SAI FAY RN    REPORT FROM: ZENA NAYLOR RN    TIME REPORT TAKEN: 0306    DIAGNOSIS: CVA    REASON FOR TRANSFER TO [facility]: HIGHER LEVEL OF CARE    TRANSPORTATION: GROUND    CLINICAL REASON FOR TRANSFER TO [facility]: PT HAS BEEN HAVING SOME RIGHT SIDED WEAKNESS SINCE FRIDAY.      CLINICAL INFORMATION    HEIGHT: 5'5\"    WEIGHT: 81.6KG    ALLERGIES: NPH INSULIN    INFECTIOUS DISEASE: NONE    ISOLATION:     VITAL SIGNS:   TIME: 0306  TEMP: 98.2  PULSE: 79  B/P: 212/79  RESP: 18      LAB INFORMATION: WNL    CULTURE INFORMATION:     MEDS/IV FLUIDS: 18G LAC  BRILINTA 180MG      CARDIAC SYSTEM:    CHEST PAIN: DENIES    RATE:     SCALE:     RHYTHM: SR    Is patient taking or has patient been given any drugs that could increase bleeding? Y      DRUG: PLAVIX     DOSE/FREQUENCY: 75MG DAILY    TROPONIN:    DATE:   TIME:   TROP:     DATE:   TIME:   TROP:       HEART " CATH:     HEART CATH DATE:     HEART CATH RESULTS:     LAD:   RCA:   CX:   LMAIN:   EF:     SWAN:     SITE:   SIZE:    DATE INSERTED:     ARTLINE:     SITE:   SIZE:   DATE INSERTED:     SHEATH:    SITE:   SIZE:   DATE INSERTED:         VASOSEAL:    SITE:   DATE INSERTED:     EXTERNAL PACEMAKER:     RATE:   EXT PACER ON:     MODE:    DATE INSERTED:   OUTPUT SETTING:   SENSITIVITY SETTING:   SENSITIVITY TYPE:     IABP:    SITE:   AUG PRESSURE:   DATE INSERTED:     CARDIAC NOTES:       RESPIRATORY SYSTEM:    LUNG SOUNDS: CTA    ABG DATE:         ABG TIME:     ABG RESULTS:    PH:   PCO2:   PO2:   HCO3:   O2 SAT:     ETT SIZE:     ORAL:     NASAL:     SECURED AT MEASUREMENT (CM):     ON VENTILATOR:    TV:   FI02:   RATE:   PEEP:     OXYGEN: ROOM AIR    O2 SAT: 100%    IMAGING FINDINGS:     PNEUMO CHEST TUBE SEAL TYPE:     RESPIRATORY STATUS:       CNS/MUSCULOSKELETAL      DAMARI COMA SCALE:    E: 4  M: 6  V: 5    LAST KNOWN WELL: FRIDAY          NIHSS    Survey Item  0: Means Alert  1: Drowsy or Answer Correctly  2: Incorrect, Forced, Can't Resist Gravity  3: Complete or No Effort  4: No Movement  NT: Not Testable Acceptable As Noted Above      1A: Level of Consciousness: 0    1B: LOC Questions (month, age) : 0    1C: LOC Commands (open/close eyes, make a fist & let go): 0    2:  Best Gaze (eyes open-pt follows examiner's fingers or face): 0    3:  Visual (introduce visual stimulus/threat to pt's visual field quad. Cover 1 eye and hold up fingers in all 4 quadrants) : 0    4.  Facial Palsy (show teeth, raise eyebrows and squeeze eyes tightly shut): 2    5A: Motor Arm-Left (elevate extremity to 90 degrees and score drift/movement.  Count to 10 aloud and use fingers for visual cue): 0    5B:  Motor Arm-Right (elevate extremity to 90 degrees and score drift/movement.  Count to 10 aloud and use fingers for visual cue): 2    6A:  Motor Leg-Left (elevate extremity to 30 degrees and score drift/movement.  Count to 5 out loud  and use fingers for visual cue): 0    6B:  Motor Leg-Right (elevate extremity to 30 degrees and score drift/movement.  Count to 5 out loud and use fingers for visual cue): 2    7:  Limb Ataxia- finger to nose, heel down shin: 0    8:  Sensory- pin prick to face, arms, trunk, and legs. Compare sharpness side to side: 1    9:  Best Language- name, items, describe picture, and read sentences.  Do not forget glasses if they normally wear them: 1    10: Dysarthria- elevate speech clarity by pt reading or repeating words on a list: 1    11: Extinction and Inattention- Use information from prior testing or double simultaneous stimuli testing to identify neglect. Face, arms, legs and visual field: 0    Total NIHSS Score: 9  Date: 6/5/23  Time of NIHSS Assessment: 0255        SIZE OF HEMORRHAGE:     CAT SCAN RESULTS:     MRI RESULTS:     CNS/MUSCULOSKELETAL NOTES:       GI//GY      ABDOMINAL PAIN: N    VOMITING: N    DIARRHEA: N    NAUSEA: N    BOWEL SOUNDS: ACTIVE    OCCULT STOOL: N    HEMATURIA: N    NG TUBE:    SIZE:   DATE INSERTED:       ULTRASOUND RESULTS:       ACUTE ABDOMEN RESULTS:       CT SCAN RESULTS:       GI//GY NOTES:     LIND:     PAST MEDICAL HISTORY: DM  HTN  CVA    OTHER SYMPTOM NOTES:     ADDITIONAL NOTES:           Maribell Vazquez RN  6/5/2023  03:22 EDT

## 2023-06-05 NOTE — PROGRESS NOTES
[facility] Medicine Services  PROGRESS NOTE    Patient Name: Alma Mederos  : 1970  MRN: [id number]    Date of Admission: 2023  Primary Care Physician: Alyce Sanchez APRN    Subjective   Subjective     CC:  stroke    HPI:  Patient seen with PT in room as well.   Feels like speech is better.     ROS:  See H&P     Objective   Objective     Vital Signs:   Temp:  [97.7 °F (36.5 °C)-98 °F (36.7 °C)] 97.7 °F (36.5 °C)  Heart Rate:  [73-83] 79  Resp:  [16] 16  BP: (124-193)/(60-95) 166/80     Physical Exam:  See H&P    Results Reviewed:  LAB RESULTS:      Lab 23  0647   WBC 7.54   HEMOGLOBIN 11.6*   HEMATOCRIT 36.6   PLATELETS 318   NEUTROS ABS 3.73   IMMATURE GRANS (ABS) 0.02   LYMPHS ABS 2.97   MONOS ABS 0.58   EOS ABS 0.19   MCV 92.0         Lab 23  0647   SODIUM 139   POTASSIUM 3.0*   CHLORIDE 103   CO2 24.0   ANION GAP 12.0   BUN 15   CREATININE 0.57   EGFR 109.5   GLUCOSE 118*   CALCIUM 9.0   MAGNESIUM 1.9   HEMOGLOBIN A1C 10.80*         Lab 23  0647   TOTAL PROTEIN 6.6   ALBUMIN 3.6   GLOBULIN 3.0   ALT (SGPT) 16   AST (SGOT) 17   BILIRUBIN 0.3   ALK PHOS 138*             Lab 23  0647   CHOLESTEROL 138   LDL CHOL 62   HDL CHOL 62*   TRIGLYCERIDES 67             Brief Urine Lab Results       None            Microbiology Results Abnormal       None            Adult Transthoracic Echo Complete W/ Cont if Necessary Per Protocol (With Agitated Saline)    Result Date: 2023    Left ventricular systolic function is normal. Calculated left ventricular EF = 64%   Saline test results are negative.     MRI Brain Without Contrast    Result Date: 2023  MRI BRAIN WO CONTRAST Date of Exam: 2023 7:10 AM EDT Indication: Stroke, follow up.  Comparison: None available. Technique:  Routine multiplanar/multisequence sequence images of the brain were obtained without contrast administration. Findings: Mildly motion-degraded examination. Parenchyma: On  diffusion-weighted images there is increased signal along the left corona radiata without ADC correlate hypointensity. There is associated FLAIR hyperintensity in this area and a small focus of associated encephalomalacia/cystic change. No  evidence of hemorrhage. Midline structures appear grossly intact. No midline shift or herniation. Mild parenchymal volume loss. Few scattered periventricular and subcortical FLAIR hyperintensities are nonspecific but often associated with chronic small vessel ischemic changes. Ventricles and extra-axial spaces: Mildly prominent ventricles and sulci secondary to volume loss. No extra-axial fluid collections. Other: Normal calvarial marrow signal. Fluid within the left posterior orbit. Scattered paranasal sinus mucosal thickening. Trace bilateral mastoid effusions. Major intracranial flow voids appear intact.     Impression: Impression: Mildly motion-degraded exam. Increased signal on DWI without ADC correlate and with mild FLAIR hyperintensity in the left corona radiata most likely representing subacute infarct. Fluid seen within the left posterior globe concerning for retinal detachment and hemorrhage. Recommend correlation with ophthalmologic exam. Additional chronic findings as above. Findings discussed with stroke team by Dr. Jack Claudio via telephone on 6/5/2023 7:53 AM EDT. Electronically Signed: Jack Claudio  6/5/2023 8:03 AM EDT  Workstation ID: [id number]    CT Outside Films    Result Date: 6/5/2023  This procedure was auto-finalized with no dictation required.    CT Outside Films    Result Date: 6/5/2023  This procedure was auto-finalized with no dictation required.     Results for orders placed during the hospital encounter of 06/05/23    Adult Transthoracic Echo Complete W/ Cont if Necessary Per Protocol (With Agitated Saline)    Interpretation Summary    Left ventricular systolic function is normal. Calculated left ventricular EF = 64%    Saline test results  are negative.      Current medications:  Scheduled Meds:aspirin, , ,   aspirin, 300 mg, Rectal, Daily  atorvastatin, 80 mg, Oral, Nightly  buPROPion XL, 150 mg, Oral, Daily  empagliflozin, 10 mg, Oral, Daily  enoxaparin, 40 mg, Subcutaneous, Daily  insulin detemir, 10 Units, Subcutaneous, Daily  insulin lispro, 2-9 Units, Subcutaneous, 4x Daily AC & at Bedtime  lactated ringers, 1,000 mL, Intravenous, Once  pantoprazole, 40 mg, Oral, Q AM  potassium chloride, 40 mEq, Oral, Q4H  senna-docusate sodium, 2 tablet, Oral, BID  sodium chloride, 10 mL, Intravenous, Q12H      Continuous Infusions:niCARdipine, 5-15 mg/hr, Last Rate: Stopped (06/05/23 1000)      PRN Meds:.  senna-docusate sodium **AND** polyethylene glycol **AND** bisacodyl **AND** bisacodyl    Calcium Replacement - Follow Nurse / BPA Driven Protocol    cyclobenzaprine    dextrose    dextrose    glucagon (human recombinant)    Magnesium Standard Dose Replacement - Follow Nurse / BPA Driven Protocol    ondansetron    Phosphorus Replacement - Follow Nurse / BPA Driven Protocol    Potassium Replacement - Follow Nurse / BPA Driven Protocol    Potassium Replacement - Follow Nurse / BPA Driven Protocol    pregabalin    sodium chloride    sodium chloride    Assessment & Plan   Assessment & Plan     Active Hospital Problems    Diagnosis  POA    **CVA (cerebral vascular accident) [I63.9]  Unknown    Right hemiparesis [G81.91]  Unknown    Aphasia [R47.01]  Unknown    History of stroke [Z86.73]  Not Applicable    DM (diabetes mellitus) [E11.9]  Unknown    HTN (hypertension) [I10]  Unknown    Hyperlipidemia [E78.5]  Unknown    Stroke [I63.9]  Yes      Resolved Hospital Problems   No resolved problems to display.        Brief Hospital Course to date:  Alma Mederos is a 52 y.o. female w/ hx HTN, HL, DM2, previous CVA (w/ residual right sided weakness and dysarthria at baseline) ~ 1 year ago. Patient reports complianct with her asa and plavix. Patient presents from [place]  [facility] ED with worsening of her right sided weakness and aphasia w/ concern for new or worsening stroke. At [facility] EKG revealed sinus rhythm, baseline labs were unremarkable. U/a was benign. CT head revealed old left thalamic and left frontal CVA. CTA head and neck revealed possible high grade stenosis in distal petrous portion of the left ica, mild-moderate narrowing right ica. [facility] Stroke team contacted and arrangements made to transfer for higher level of care.     This patient's problems and plans were partially entered by my partner and updated as appropriate by me 06/05/23.        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      DM2  -basal bolus insulin, A1C 10.8     HypoK  -K 3.0 this AM- will put in replacement protocol     Retinal Detachment- history of   --noted on imaging, d/w patient. She has h/o left sided retinal detachment/hemorrhage and has had surgery at  approx 3 years ago. She notes no new visual symptoms today. This is a chronic finding          DVT prophylaxis: sq lovenox       Expected Discharge Location and Transportation:   Expected Discharge   Expected Discharge Date: 6/8/2023; Expected Discharge Time:      DVT prophylaxis:  Medical and mechanical DVT prophylaxis orders are present.     AM-PAC 6 Clicks Score (PT): 13 (06/05/23 6713)    CODE STATUS:   Code Status and Medical Interventions:   Ordered at:  06/05/23 0619     Code Status (Patient has no pulse and is not breathing):    CPR (Attempt to Resuscitate)     Medical Interventions (Patient has pulse or is breathing):    Full Support       Penelope Trevizo MD  06/05/23

## 2023-06-05 NOTE — THERAPY EVALUATION
Patient Name: Alma Mederos  : 1970    MRN: [id number]                              Today's Date: 2023       Admit Date: 2023    Visit Dx:     ICD-10-CM ICD-9-CM   1. Cerebrovascular accident (CVA), unspecified mechanism  I63.9 434.91   2. Cognitive communication deficit  R41.841 799.52     Patient Active Problem List   Diagnosis    Right hemiparesis    Aphasia    History of stroke    CVA (cerebral vascular accident)    DM (diabetes mellitus)    HTN (hypertension)    Hyperlipidemia    Stroke     Past Medical History:   Diagnosis Date    Diabetes mellitus     Hypertension     Stroke      Past Surgical History:   Procedure Laterality Date    APPENDECTOMY        General Information       Row Name 23 1151          Physical Therapy Time and Intention    Document Type evaluation  -CD     Mode of Treatment physical therapy  -CD       Row Name 23 1151          General Information    Patient Profile Reviewed yes  -CD     Prior Level of Function independent:;transfer;bed mobility;w/c or scooter;min assist:;gait;ADL's;max assist:;using stairs  PT HAD RESIDUAL R SIDED DEFICITS AND SLURRED SPEECH FROM CVA 1 YEAR AGO BUT NOW WITH INCREASED SYMPTOMS. HAS REQUIRED ASSIST OF 1 WITH GAIT FOR THE LAST YEAR BUT WAS ABLE TO TRANSFER SELF TO W/C INDEPENDENTLY.  -CD     Existing Precautions/Restrictions fall  R SIDED WEAKNESS, IMPAIRED SENSATION, SPASTIC R UE.  -CD     Barriers to Rehab medically complex;previous functional deficit  -CD       Row Name 23 1151          Living Environment    People in Home significant other  S.O. IS CURRENTLY HOME DUE TO UNEMPLOYMENT.  -CD       Row Name 23 1151          Home Main Entrance    Number of Stairs, Main Entrance other (see comments)  30 STEPS.  -CD     Stair Railings, Main Entrance railing on left side (ascending)  -CD       Row Name 23 1151          Stairs Within Home, Primary    Stairs, Within Home, Primary NON.  -CD     Number of Stairs, Within  Home, Primary none  -CD       Row Name 06/05/23 1151          Cognition    Orientation Status (Cognition) oriented x 3  -CD       Row Name 06/05/23 1151          Safety Issues, Functional Mobility    Safety Issues Affecting Function (Mobility) insight into deficits/self-awareness;safety precaution awareness;safety precautions follow-through/compliance;sequencing abilities  -CD     Impairments Affecting Function (Mobility) balance;coordination;endurance/activity tolerance;postural/trunk control;strength;sensation/sensory awareness  -CD     Comment, Safety Issues/Impairments (Mobility) PERIPHERAL NEUROPATHY, LIMITED DF TO NEUTRAL B, RESIDUAL R SIDED DEFICITS NOW WORSE.  -CD               User Key  (r) = Recorded By, (t) = Taken By, (c) = Cosigned By      Initials Name Provider Type    CD Romelia Cesar, PT Physical Therapist                   Mobility       Row Name 06/05/23 1157          Bed Mobility    Bed Mobility supine-sit  -CD     Supine-Sit [place] (Bed Mobility) contact guard  -CD     Assistive Device (Bed Mobility) bed rails;head of bed elevated  -CD     Comment, (Bed Mobility) INCREASED TIME AND EFFORT.  -CD       Row Name 06/05/23 1157          Transfers    Comment, (Transfers) STS FROM EOB VIA B UE SUPPORT.  -CD       Row Name 06/05/23 1157          Sit-Stand Transfer    Sit-Stand [place] (Transfers) minimum assist (75% patient effort);2 person assist  -CD     Assistive Device (Sit-Stand Transfers) --  B UE SUPPORT.  -CD       Row Name 06/05/23 1157          Gait/Stairs (Locomotion)    [place] Level (Gait) moderate assist (50% patient effort);2 person assist  -CD     Assistive Device (Gait) --  B UE SUPPORT AND GAIT BELT  -CD     Distance in Feet (Gait) 28 FEET.  -CD     Deviations/Abnormal Patterns (Gait) stride length decreased;weight shifting decreased;gait speed decreased  -CD     Bilateral Gait Deviations forward flexed posture  -CD     Right Sided Gait Deviations knee buckling, right  side;weight shift ability decreased  -CD     Comment, (Gait/Stairs) R UE HELD FLEXED (SPASTIC) DURING GAIT.  -CD               User Key  (r) = Recorded By, (t) = Taken By, (c) = Cosigned By      Initials Name Provider Type    CD Romelia Cesar, PT Physical Therapist                   Obj/Interventions       Row Name 06/05/23 1200          Range of Motion Comprehensive    General Range of Motion lower extremity range of motion deficits identified  -CD     Comment, General Range of Motion LIMITED DF TO NEUTRAL (BASELINE PER PT), OTHERWISE WFL'S  -CD       Row Name 06/05/23 1200          Motor Skills    Motor Skills coordination;functional endurance;neuro-muscular function  -CD     Coordination gross motor deficit;right;lower extremity;moderate impairment  -CD     Neuromuscular Function upper extremity;flexion synergy pattern;right  -CD       Row Name 06/05/23 1200          Balance    Balance Assessment sitting static balance;sitting dynamic balance;sit to stand dynamic balance;standing static balance;standing dynamic balance  -CD     Static Sitting Balance standby assist  -CD     Dynamic Sitting Balance contact guard  -CD     Position, Sitting Balance unsupported;sitting edge of bed  -CD     Sit to Stand Dynamic Balance minimal assist;2-person assist  -CD     Static Standing Balance minimal assist;2-person assist;verbal cues  -CD     Dynamic Standing Balance moderate assist;2-person assist;verbal cues  -CD     Position/Device Used, Standing Balance supported  B UE SUPPORT.  -CD     Balance Interventions sitting;standing;sit to stand;supported;static;dynamic  -CD     Comment, Balance PT RELIES HEAVILY ON UE SUPPORT FOR GAIT DUE TO R SIDE [place], IMPAIRED BALANCE.  -CD       Row Name 06/05/23 1200          Sensory Assessment (Somatosensory)    Sensory Assessment (Somatosensory) bilateral LE  PERIPHERAL NEUROPATHY B LE, IMPAIRED SENSATION R ALSO FROM PRIOR CVA.  -CD               User Key  (r) = Recorded By, (t) =  Taken By, (c) = Cosigned By      Initials Name Provider Type    CD Romelia Cesar, PT Physical Therapist                   Goals/Plan       Row Name 06/05/23 1322          Bed Mobility Goal 1 (PT)    Activity/Assistive Device (Bed Mobility Goal 1, PT) sit to supine/supine to sit  -CD     [place] Level/Cues Needed (Bed Mobility Goal 1, PT) modified independence  -CD     Time Frame (Bed Mobility Goal 1, PT) long term goal (LTG);2 weeks  -CD       Row Name 06/05/23 1322          Transfer Goal 1 (PT)    Activity/Assistive Device (Transfer Goal 1, PT) bed-to-chair/chair-to-bed;sit-to-stand/stand-to-sit;walker, edilberto  -CD     [place] Level/Cues Needed (Transfer Goal 1, PT) minimum assist (75% or more patient effort)  -CD     Time Frame (Transfer Goal 1, PT) long term goal (LTG);2 weeks  -       Row Name 06/05/23 1322          Gait Training Goal 1 (PT)    Activity/Assistive Device (Gait Training Goal 1, PT) gait (walking locomotion);walker, edilberto  -CD     [place] Level (Gait Training Goal 1, PT) minimum assist (75% or more patient effort)  -CD     Distance (Gait Training Goal 1, PT) 200  -CD     Time Frame (Gait Training Goal 1, PT) long term goal (LTG);2 weeks  -       Row Name 06/05/23 1322          Therapy Assessment/Plan (PT)    Planned Therapy Interventions (PT) balance training;bed mobility training;gait training;transfer training;home exercise program;patient/family education;strengthening;motor coordination training;neuromuscular re-education  -CD               User Key  (r) = Recorded By, (t) = Taken By, (c) = Cosigned By      Initials Name Provider Type    CD Romelia Cesar, PT Physical Therapist                   Clinical Impression       Row Name 06/05/23 1315          Pain    Pretreatment Pain Rating 3/10  -CD     Posttreatment Pain Rating 3/10  -CD     Pain Location - Side/Orientation Right  -CD     Pain Location - hand  -CD     Pre/Posttreatment Pain Comment PT REPORTS SPASTICITY R HAND IS  NEW THIS ADMIT AND CAUSING PAIN.  -CD     Pain Intervention(s) Repositioned  DISCUSSED WITH OGloriaTGloria AND MD.  -CD       Row Name 06/05/23 1316          Plan of Care Review    Plan of Care Reviewed With patient  -CD     Outcome Evaluation PT IS  S/P NEWLY DIAGNOSED SA CVA  AND PRESENTS WITH EVOLVING SYMPTOMS TO INCLUDE INCREASED R SIDED WEAKNESS, IMPAIRED BALANCE, IMPAIRED COORDINATION AND DECLINE IN FUNCTIONAL MOBILITY. PT CURRENTLY REQUIRES MOD ASSIST OF 2 VIA B UE SUPPORT TO AMBULATE 28 FEET. PT IS A&O AND FOLLOWS ALL COMMANDS. RECOMMEND IRF FOR BEST OUTCOME. PT IS HIGHLY MOTIVATED TO WORK WITH THERAPY AND ENDORSES GOAL TO RETURN TO INDEPENDENT AMBULATION WITH CHERELLE WX.  -CD       Row Name 06/05/23 131          Therapy Assessment/Plan (PT)    Patient/Family Therapy Goals Statement (PT) TO GO TO IRF  -CD     Rehab Potential (PT) good, to achieve stated therapy goals  -CD     Criteria for Skilled Interventions Met (PT) yes;meets criteria;skilled treatment is necessary  -CD     Therapy Frequency (PT) daily  -CD     Predicted Duration of Therapy Intervention (PT) 2 WEEKS  -CD       Row Name 06/05/23 1313          Vital Signs    Intra Systolic BP Rehab 100  -CD     Intra Treatment Diastolic BP 56  -CD     Post Systolic BP Rehab 117  -CD     Post Treatment Diastolic BP 64  -CD     Pre SpO2 (%) 100  -CD     O2 Delivery Pre Treatment room air  -CD     O2 Delivery Intra Treatment room air  -CD     O2 Delivery Post Treatment room air  -CD     Pre Patient Position Supine  -CD     Intra Patient Position Standing  -CD     Post Patient Position Sitting  -CD       Row Name 06/05/23 1319          Positioning and Restraints    Pre-Treatment Position in bed  -CD     Post Treatment Position chair  -CD     In Chair reclined;call light within reach;encouraged to call for assist;exit alarm on;legs elevated;notified nsg;RUE elevated  NSG NOTIFIED PT IS WANTING FOOD TRAY. STILL WAITING.  -CD               User Key  (r) = Recorded By, (t) =  Taken By, (c) = Cosigned By      Initials Name Provider Type    Romelia Porter, PT Physical Therapist                   Outcome Measures       Row Name 06/05/23 1325 06/05/23 0800       How much help from another person do you currently need...    Turning from your back to your side while in flat bed without using bedrails? 3  -CD 3  -TG    Moving from lying on back to sitting on the side of a flat bed without bedrails? 3  -CD 3  -TG    Moving to and from a bed to a chair (including a wheelchair)? 2  -CD 2  -TG    Standing up from a chair using your arms (e.g., wheelchair, bedside chair)? 2  -CD 2  -TG    Climbing 3-5 steps with a railing? 1  -CD 1  -TG    To walk in hospital room? 2  -CD 2  -TG    AM-PAC 6 Clicks Score (PT) 13  -CD 13  -TG    Highest level of mobility 4 --> Transferred to chair/commode  -CD 4 --> Transferred to chair/commode  -TG              User Key  (r) = Recorded By, (t) = Taken By, (c) = Cosigned By      Initials Name Provider Type    Romelia Porter, PT Physical Therapist    TG Endy Morton RN Registered Nurse                                 Physical Therapy Education       Title: PT OT SLP Therapies (Done)       Topic: Physical Therapy (Done)       Point: Mobility training (Done)       Learning Progress Summary             Patient Acceptance, E, VU,NR by CD at 6/5/2023 1326    Comment: BENEFITS OF OOB ACTIVITY, SAFETY WITH MOBILITY, PROGRESSION OF POC, D/C PLANNING,                         Point: Home exercise program (Done)       Learning Progress Summary             Patient Acceptance, E, VU,NR by CD at 6/5/2023 1326    Comment: BENEFITS OF OOB ACTIVITY, SAFETY WITH MOBILITY, PROGRESSION OF POC, D/C PLANNING,                         Point: Body mechanics (Done)       Learning Progress Summary             Patient Acceptance, E, VU,NR by CD at 6/5/2023 1326    Comment: BENEFITS OF OOB ACTIVITY, SAFETY WITH MOBILITY, PROGRESSION OF POC, D/C PLANNING,                         Point:  Precautions (Done)       Learning Progress Summary             Patient Acceptance, E, VU,NR by CD at 6/5/2023 1326    Comment: BENEFITS OF OOB ACTIVITY, SAFETY WITH MOBILITY, PROGRESSION OF POC, D/C PLANNING,                                         User Key       Initials Effective Dates Name Provider Type Discipline    CD 02/03/23 -  Romelia Cesar PT Physical Therapist PT                  PT Recommendation and Plan  Planned Therapy Interventions (PT): balance training, bed mobility training, gait training, transfer training, home exercise program, patient/family education, strengthening, motor coordination training, neuromuscular re-education  Plan of Care Reviewed With: patient  Outcome Evaluation: PT IS  S/P NEWLY DIAGNOSED SA CVA  AND PRESENTS WITH EVOLVING SYMPTOMS TO INCLUDE INCREASED R SIDED WEAKNESS, IMPAIRED BALANCE, IMPAIRED COORDINATION AND DECLINE IN FUNCTIONAL MOBILITY. PT CURRENTLY REQUIRES MOD ASSIST OF 2 VIA B UE SUPPORT TO AMBULATE 28 FEET. PT IS A&O AND FOLLOWS ALL COMMANDS. RECOMMEND IRF FOR BEST OUTCOME. PT IS HIGHLY MOTIVATED TO WORK WITH THERAPY AND ENDORSES GOAL TO RETURN TO INDEPENDENT AMBULATION WITH CHERELLE WX.     Time Calculation:    PT Charges       Row Name 06/05/23 1333             Time Calculation    Start Time 0907  -CD      PT Received On 06/05/23  -CD      PT Goal Re-Cert Due Date 06/15/23  -CD         Time Calculation- PT    Total Timed Code Minutes- PT 15 minute(s)  -CD         Timed Charges    48177 - Gait Training Minutes  15  -CD         Untimed Charges    PT Eval/Re-eval Minutes 60  -CD         Total Minutes    Timed Charges Total Minutes 15  -CD      Untimed Charges Total Minutes 60  -CD       Total Minutes 75  -CD                User Key  (r) = Recorded By, (t) = Taken By, (c) = Cosigned By      Initials Name Provider Type    CD Romelia Cesar PT Physical Therapist                  Therapy Charges for Today       Code Description Service Date Service Provider Modifiers Qty     [id number]  GAIT TRAINING EA 15 MIN 6/5/2023 Romelia Cesar, PT GP 1    [id number] HC PT EVAL MOD COMPLEXITY 4 6/5/2023 Romelia Cesar, PT GP 1            PT G-Codes  AM-PAC 6 Clicks Score (PT): 13  PT Discharge Summary  Anticipated Discharge Disposition (PT): inpatient rehabilitation facility (PT [facility]. CM NOTIFIED.)    Romelia Cesar, PT  6/5/2023

## 2023-06-05 NOTE — THERAPY EVALUATION
Patient Name: Alma Mederos  : 1970    MRN: [id number]                              Today's Date: 2023       Admit Date: 2023    Visit Dx:     ICD-10-CM ICD-9-CM   1. Cerebrovascular accident (CVA), unspecified mechanism  I63.9 434.91   2. Cognitive communication deficit  R41.841 799.52     Patient Active Problem List   Diagnosis    Right hemiparesis    Aphasia    History of stroke    CVA (cerebral vascular accident)    DM (diabetes mellitus)    HTN (hypertension)    Hyperlipidemia    Stroke     Past Medical History:   Diagnosis Date    Diabetes mellitus     Hypertension     Stroke      Past Surgical History:   Procedure Laterality Date    APPENDECTOMY        General Information       Row Name 23 1426          OT Time and Intention    Document Type evaluation  -JY     Mode of Treatment occupational therapy;individual therapy  -JY       Row Name 23 1426          General Information    Patient Profile Reviewed yes  -JY     Prior Level of Function independent:;transfer;bed mobility;w/c or scooter;feeding;grooming;min assist:;dressing;bathing;ADL's;mod assist:;cooking;cleaning;home management;max assist:;using stairs  pt w/ residual weakness, deficits from CVA ~1 yr ago, has completed rehab x 2, PTA able to complete simple g/h, self feeding with LUE, req'd A for more dynamic ADLs, mobility, able to t/f to w/c [place]; endorses 3 falls/last 6 months  -JY     Existing Precautions/Restrictions fall;other (see comments)  R side weakness, impaired sensation, spastic RUE, 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, 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 unemployed  -       Row Name 06/05/23 1426          Home Main Entrance    Number of Stairs, Main Entrance other (see comments)  30  -JY     Stair Railings, Main Entrance railing on left side (ascending)  -       Row Name 06/05/23 1426          Stairs Within Home, Primary    Number of Stairs, Within Home, Primary none  -       Row Name 06/05/23 1426          Cognition    Orientation Status (Cognition) oriented x 4  -Y       Row Name 06/05/23 1426          Safety Issues, Functional Mobility    Safety Issues Affecting Function (Mobility) insight into deficits/self-awareness;awareness of need for assistance;sequencing abilities;safety precaution awareness;safety precautions follow-through/compliance  -J     Impairments Affecting Function (Mobility) balance;coordination;endurance/activity tolerance;postural/trunk control;strength;sensation/sensory awareness  -JY     Comment, Safety Issues/Impairments (Mobility) pt alert and able to follow commands, limited by peripheral neuropathy, residual R side deficits (now greater impact), legally blind at L eye and report of remote injury to ankle impacting use  -JY               User Key  (r) = Recorded By, (t) = Taken By, (c) = Cosigned By      Initials Name Provider Type    Kiley Foley, OT Occupational Therapist                     Mobility/ADL's       Row Name 06/05/23 1436          Bed Mobility    Bed Mobility other (see comments)  UIC  -JY     Comment, (Bed Mobility) pt UIC upon OT arrival and preferred to remain OOB thus bed mobility not assessed this date  -JY       Row Name 06/05/23 1436          Transfers    Transfers sit-stand transfer;stand-sit transfer;toilet transfer  -JY     Comment, (Transfers) skilled cues for optimal hand placement and seq to promote pushing up from  seated surface and reaching back prior to sitting grossly with LUE once in close proximity to seated surface; denied any dizziness or feeling LH in standing  -[facility] Name 06/05/23 1436          Sit-Stand Transfer    Sit-Stand [place] (Transfers) minimum assist (75% patient effort);2 person assist;verbal cues  -JY     Assistive Device (Sit-Stand Transfers) other (see comments)  BUE support  -[facility] Name 06/05/23 1436          Stand-Sit Transfer    Stand-Sit [place] (Transfers) minimum assist (75% patient effort);2 person assist;verbal cues  -JY     Assistive Device (Stand-Sit Transfers) other (see comments)  BUE support  -[facility] Name 06/05/23 1436          Toilet Transfer    Type (Toilet Transfer) stand-sit;sit-stand  -Y     [place] Level (Toilet Transfer) minimum assist (75% patient effort);moderate assist (50% patient effort);verbal cues  -JY     Assistive Device (Toilet Transfer) commode;grab bars/safety frame  -     Comment, (Toilet Transfer) pt with grab bar at R (impacted) side thus difficult to use for support unless facing grab bar head on and reaching with LUE to grasp, minimal use of R UE to push upward to stand and further BUE support; pt req'd cues for eccentric control at all surfaces  -JY       Row Name 06/05/23 1436          Functional Mobility    Functional Mobility- Ind. Level minimum assist (75% patient effort);2 person assist required;verbal cues required  -     Functional Mobility- Device other (see comments)  BUE support  -     Functional Mobility-Distance (Feet) --  in room ADL related distances  -     Functional Mobility- Comment refer to PT for specifics regarding fxl mobility (to/frolm bathroom) specifically ensuring L & R sides supported during mobility in absence of AD, cues for seq; pt reports using edilberto walker and w/c at baseline  -[facility] Name 06/05/23 1436          Activities of Daily Living    BADL Assessment/Intervention upper body  dressing;lower body dressing;toileting  -JY       Row Name 06/05/23 1436          Upper Body Dressing Assessment/Training    [place] Level (Upper Body Dressing) doff;don;pajama/robe;moderate assist (50% patient effort);verbal cues  -JY     Position (Upper Body Dressing) supported sitting  -JY       Row Name 06/05/23 1436          Lower Body Dressing Assessment/Training    [place] Level (Lower Body Dressing) doff;don;socks;contact guard assist  -JY     Position (Lower Body Dressing) edge of bed sitting  -JY     Comment, (Lower Body Dressing) pt able to grossly bring LEs more proximally to improve reach yet more difficult manipulation, grasp of sock , bimanual mgmt with greater defiicts at R UE (increased s/s compared to remote deficits); anticipate greater need for A in more dynamic and involved components of LBD  -JY       Row Name 06/05/23 1436          Toileting Assessment/Training    [place] Level (Toileting) adjust/manage clothing;moderate assist (50% patient effort);perform perineal hygiene  -JY     Assistive Devices (Toileting) commode;grab bar/safety frame  -JY     Position (Toileting) unsupported sitting;supported standing  -JY     Comment, (Toileting) pt able to complete seated hygiene w/o A, req'd A in clothing mgmt at R side limiated by R side deficits  -JY               User Key  (r) = Recorded By, (t) = Taken By, (c) = Cosigned By      Initials Name Provider Type    Kiley oFley OT Occupational Therapist                   Obj/Interventions       Row Name 06/05/23 1450          Sensory Assessment (Somatosensory)    Sensory Assessment (Somatosensory) bilateral UE;sensation intact  -JY     Bilateral UE Sensory Assessment general sensation;light touch awareness;intact  -JY     Sensory Assessment denies any numbness or tingling and able to recognize LT stimuli as intact and symmetrical at BUEs  -JY       Row Name 06/05/23 1450          Vision Assessment/Intervention    Visual  Impairment/Limitations legally blind;visual/perceptual impairments present;other (see comments)  per pt blind in L side from remote incident  -     Vision Assessment Comment per pt blind in L eye from remote incident, able to identify peripheral input at R eye  -       Row Name 06/05/23 1450          Range of Motion Comprehensive    General Range of Motion upper extremity range of motion deficits identified  -JY     Comment, General Range of Motion baseline deficits at RUE from remote CVA, appears worse since these admitting s/s; pt able to demonstrate ~ 50% of ROM actively, able to tolerate ~ 75 % with PROM, no increase in pain  -       Row Name 06/05/23 1450          Strength Comprehensive (MMT)    General Manual Muscle Testing (MMT) Assessment upper extremity strength deficits identified  -JY     Comment, General Manual Muscle Testing (MMT) Assessment baseline R UE weakness, LUE MMS grossly 4/5 per MMT  -[facility] Name 06/05/23 1450          Shoulder (Therapeutic Exercise)    Shoulder (Therapeutic Exercise) AAROM (active assistive range of motion)  SROM  -JY     Shoulder AAROM (Therapeutic Exercise) left assist right;flexion;extension;scapular protraction;scapular retraction;sitting;10 repetitions  -[facility] Name 06/05/23 1450          Elbow/Forearm (Therapeutic Exercise)    Elbow/Forearm (Therapeutic Exercise) AAROM (active assistive range of motion);other (see comments)  SROM  -JY     Elbow/Forearm AAROM (Therapeutic Exercise) left assist right;flexion;extension;supination;pronation;sitting;10 repetitions  -[facility] Name 06/05/23 1450          Wrist (Therapeutic Exercise)    Wrist (Therapeutic Exercise) AAROM (active assistive range of motion);other (see comments)  SROM  -JY     Wrist AAROM (Therapeutic Exercise) left assist right;flexion;extension;10 repetitions  -       Row Name 06/05/23 1450          Hand (Therapeutic Exercise)    Hand (Therapeutic Exercise) AROM (active range of motion)   -JY     Hand AROM/AAROM (Therapeutic Exercise) AROM (active range of motion);bilateral;finger flexion;finger extension;10 repetitions  -JY       Row Name 06/05/23 1450          Motor Skills    Motor Skills functional endurance;coordination  -JY     Coordination bilateral;upper extremity;finger to nose;other (see comments);WFL  finger thumb opposition  -JY     Functional Endurance decreased activity tolerance toward more dynamic tasks  -JY     Therapeutic Exercise other (see comments);elbow/forearm;wrist;hand;shoulder  facilitated SROM to assist w/ joint mobility at RUE to prep for more fxl use  -JY       Row Name 06/05/23 1450          Balance    Balance Assessment sitting static balance;sitting dynamic balance;standing static balance;standing dynamic balance  -JY     Static Sitting Balance standby assist  -JY     Dynamic Sitting Balance contact guard  -JY     Position, Sitting Balance unsupported;sitting in chair  -JY     Static Standing Balance minimal assist;2-person assist;verbal cues  -JY     Dynamic Standing Balance minimal assist;2-person assist;verbal cues  -JY     Position/Device Used, Standing Balance other (see comments)  BUE support  -JY     Balance Interventions sitting;standing;static;dynamic;sit to stand;supported;occupation based/functional task  -JY     Comment, Balance no overt LOB during seated or standing tasks, heavy reliance on UEs to offset deficits at LB  -JY               User Key  (r) = Recorded By, (t) = Taken By, (c) = Cosigned By      Initials Name Provider Type    Kiley Foley OT Occupational Therapist                   Goals/Plan       Row Name 06/05/23 1510          Transfer Goal 1 (OT)    Activity/Assistive Device (Transfer Goal 1, OT) sit-to-stand/stand-to-sit;bed-to-chair/chair-to-bed;commode  -JY     [place] Level/Cues Needed (Transfer Goal 1, OT) minimum assist (75% or more patient effort);other (see comments)  x1  -JY     Time Frame (Transfer Goal 1, OT) long term  goal (LTG);by discharge  -JY     Progress/Outcome (Transfer Goal 1, OT) goal ongoing  -[employer]       Row Name 06/05/23 1510          Dressing Goal 1 (OT)    Activity/Device (Dressing Goal 1, OT) upper body dressing;lower body dressing;other (see comments)  d/d TB garments with AE PRN with optimal seq, tech  -JY     [place]/Cues Needed (Dressing Goal 1, OT) minimum assist (75% or more patient effort);verbal cues required  -JY     Time Frame (Dressing Goal 1, OT) long term goal (LTG);by discharge  -JY     Progress/Outcome (Dressing Goal 1, OT) goal ongoing  -[employer]       Row Name 06/05/23 1510          Toileting Goal 1 (OT)    Activity/Device (Toileting Goal 1, OT) adjust/manage clothing;perform perineal hygiene;commode;grab bar/safety frame;raised toilet seat  -JY     [place] Level/Cues Needed (Toileting Goal 1, OT) contact guard required;minimum assist (75% or more patient effort);verbal cues required  -JY     Time Frame (Toileting Goal 1, OT) long term goal (LTG);by discharge  -JY     Progress/Outcome (Toileting Goal 1, OT) goal ongoing  -[employer]       Row Name 06/05/23 1510          ROM Goal 1 (OT)    ROM Goal 1 (OT) Pt to complete HEP encompassing BUE ROM and strength with increased focus on R UE with progressive sets/reps/resistance in order to improve integration in ADLs, related t/fs, mobility  -JY     Time Frame (ROM Goal 1, OT) long term goal (LTG);by discharge  -JY     Progress/Outcome (ROM Goal 1, OT) goal ongoing  -[employer]       Row Name 06/05/23 1510          Therapy Assessment/Plan (OT)    Planned Therapy Interventions (OT) activity tolerance training;adaptive equipment training;BADL retraining;functional balance retraining;neuromuscular control/coordination retraining;occupation/activity based interventions;passive ROM/stretching;patient/caregiver education/training;ROM/therapeutic exercise;strengthening exercise;transfer/mobility retraining  -JY               User Key  (r) = Recorded By, (t) = Taken By, (c) =  Cosigned By      Initials Name Provider Type    Kiley Foley, KIM Occupational Therapist                   Clinical Impression       Row Name 06/05/23 1503          Pain Assessment    Pretreatment Pain Rating 0/10 - no pain  -JY     Posttreatment Pain Rating 0/10 - no pain  -JY     Pre/Posttreatment Pain Comment denies any pain and tolerated all OT interventions  -JY     Pain Intervention(s) Repositioned;Ambulation/increased activity;Elevated;Rest  -JY       Row Name 06/05/23 1503          Plan of Care Review    Plan of Care Reviewed With patient  -JY     Progress no change  OT IE  -JY     Outcome Evaluation OT evaluation completed. Pt presents w/ decreased I in ADLs, related t/fs, mobility compared to baseline limited by greater R side deficits since remote CVA (July 2022), muscle weakness, impaired balance and coordination and general recent decline. Pt requires A for all ADLs and upward of A x 2 for fxl transfers and mobility related to ADLs thus below baseline occupational performance. REcommend IPOT POC and IRF at d/c when medically ready.  -JY       Row Name 06/05/23 1503          Therapy Assessment/Plan (OT)    Patient/Family Therapy Goal Statement (OT) to maximize I in ADLs, related t/fs, mobility, return to PLOF  -JY     Rehab Potential (OT) good, to achieve stated therapy goals  -JY     Criteria for Skilled Therapeutic Interventions Met (OT) yes;skilled treatment is necessary  -JY     Therapy Frequency (OT) daily  -JY       Row Name 06/05/23 1503          Therapy Plan Review/Discharge Plan (OT)    Anticipated Discharge Disposition (OT) inpatient rehabilitation facility  -JY       Row Name 06/05/23 1503          Vital Signs    Pre Systolic BP Rehab 131  -JY     Pre Treatment Diastolic BP 72  -JY     Pretreatment Heart Rate (beats/min) 78  -JY     Posttreatment Heart Rate (beats/min) 80  -JY     Pre SpO2 (%) 98  -JY     O2 Delivery Pre Treatment room air  -JY     O2 Delivery Intra Treatment room air  -JY      Post SpO2 (%) 96  -JY     O2 Delivery Post Treatment room air  -JY     Pre Patient Position Sitting  -JY     Intra Patient Position Standing  -JY     Post Patient Position Sitting  -JY       Row Name 06/05/23 1503          Positioning and Restraints    Pre-Treatment Position sitting in chair/recliner  -JY     Post Treatment Position chair  -JY     In Chair notified nsg;reclined;call light within reach;encouraged to call for assist;exit alarm on;RUE elevated;waffle cushion;legs elevated;heels elevated  -JY               User Key  (r) = Recorded By, (t) = Taken By, (c) = Cosigned By      Initials Name Provider Type    Kiley Foley, KIM Occupational Therapist                   Outcome Measures       Row Name 06/05/23 1513          How much help from another is currently needed...    Putting on and taking off regular lower body clothing? 3  -JY     Bathing (including washing, rinsing, and drying) 2  -JY     Toileting (which includes using toilet bed pan or urinal) 3  -JY     Putting on and taking off regular upper body clothing 2  -JY     Taking care of personal grooming (such as brushing teeth) 3  -JY     Eating meals 3  -JY     AM-PAC 6 Clicks Score (OT) 16  -JY       Row Name 06/05/23 1325 06/05/23 0800       How much help from another person do you currently need...    Turning from your back to your side while in flat bed without using bedrails? 3  -CD 3  -TG    Moving from lying on back to sitting on the side of a flat bed without bedrails? 3  -CD 3  -TG    Moving to and from a bed to a chair (including a wheelchair)? 2  -CD 2  -TG    Standing up from a chair using your arms (e.g., wheelchair, bedside chair)? 2  -CD 2  -TG    Climbing 3-5 steps with a railing? 1  -CD 1  -TG    To walk in hospital room? 2  -CD 2  -TG    AM-PAC 6 Clicks Score (PT) 13  -CD 13  -TG    Highest level of mobility 4 --> Transferred to chair/commode  -CD 4 --> Transferred to chair/commode  -TG      Row Name 06/05/23 4694           Functional Assessment    Outcome Measure Options AM-PAC 6 Clicks Daily Activity (OT);Modified [place]  -ROSIE               User Key  (r) = Recorded By, (t) = Taken By, (c) = Cosigned By      Initials Name Provider Type    Romelia Porter, PT Physical Therapist    Endy Esteban, RN Registered Nurse    Kiley Foley OT Occupational Therapist                    Occupational Therapy Education       Title: PT OT SLP Therapies (In Progress)       Topic: Occupational Therapy (In Progress)       Point: ADL training (In Progress)       Description:   Instruct learner(s) on proper safety adaptation and remediation techniques during self care or transfers.   Instruct in proper use of assistive devices.                  Learning Progress Summary             Patient Acceptance, E,D, NR by ROSIE at 6/5/2023 1318                         Point: Home exercise program (In Progress)       Description:   Instruct learner(s) on appropriate technique for monitoring, assisting and/or progressing therapeutic exercises/activities.                  Learning Progress Summary             Patient Acceptance, E,D, NR by ROSIE at 6/5/2023 1318                         Point: Precautions (In Progress)       Description:   Instruct learner(s) on prescribed precautions during self-care and functional transfers.                  Learning Progress Summary             Patient Acceptance, E,D, NR by ROSIE at 6/5/2023 1318                         Point: Body mechanics (In Progress)       Description:   Instruct learner(s) on proper positioning and spine alignment during self-care, functional mobility activities and/or exercises.                  Learning Progress Summary             Patient Acceptance, E,D, NR by ROSIE at 6/5/2023 1318                                         User Key       Initials Effective Dates Name Provider Type Discipline    ROSIE 06/16/21 -  Kiley Jennings OT Occupational Therapist OT                  OT Recommendation and Plan  Planned  Therapy Interventions (OT): activity tolerance training, adaptive equipment training, BADL retraining, functional balance retraining, neuromuscular control/coordination retraining, occupation/activity based interventions, passive ROM/stretching, patient/caregiver education/training, ROM/therapeutic exercise, strengthening exercise, transfer/mobility retraining  Therapy Frequency (OT): daily  Plan of Care Review  Plan of Care Reviewed With: patient  Progress: no change (OT IE)  Outcome Evaluation: OT evaluation completed. Pt presents w/ decreased I in ADLs, related t/fs, mobility compared to baseline limited by greater R side deficits since remote CVA (July 2022), muscle weakness, impaired balance and coordination and general recent decline. Pt requires A for all ADLs and upward of A x 2 for fxl transfers and mobility related to ADLs thus below baseline occupational performance. REcommend IPOT POC and IRF at d/c when medically ready.     Time Calculation:    Time Calculation- OT       Row Name 06/05/23 1515 06/05/23 1333          Time Calculation- OT    OT Start Time 1318  -JY --     OT Received On 06/05/23 -JY --     OT Goal Re-Cert Due Date 06/15/23  -JY --        Timed Charges    04403 - Gait Training Minutes  -- 15  -CD     34591 - OT Therapeutic Activity Minutes 10  -JY --     85512 - OT Self Care/Mgmt Minutes 14  -JY --        Untimed Charges    OT Eval/Re-eval Minutes 50  -JY --        Total Minutes    Timed Charges Total Minutes 24  -JY 15  -CD     Untimed Charges Total Minutes 50  -JY --      Total Minutes 74  -JY 15  -CD               User Key  (r) = Recorded By, (t) = Taken By, (c) = Cosigned By      Initials Name Provider Type    Romelia Porter PT Physical Therapist    Kiley Foley OT Occupational Therapist                  Therapy Charges for Today       Code Description Service Date Service Provider Modifiers Qty    78104022824 HC OT THERAPEUTIC ACT EA 15 MIN 6/5/2023 Kiley Jennings OT GO 1     29035967306 HC OT SELF CARE/MGMT/TRAIN EA 15 MIN 6/5/2023 Kiley Jennings, OT GO 1    79103070132 HC OT EVAL LOW COMPLEXITY 4 6/5/2023 Kiley Jennings OT GO 1                 Kiley Jeninngs OT  6/5/2023

## 2023-06-05 NOTE — H&P
[facility] Medicine Services  HISTORY AND PHYSICAL    Patient Name: Alma Mederos  : 1970  MRN: [id number]  Primary Care Physician: No primary care provider on file.  Date of admission: 2023      Subjective   Subjective     Chief Complaint:  Right sided weakness and aphasia    HPI:  Alma Mederos is a 52 y.o. female w/ hx HTN, HL, DM2, previous CVA (w/ residual right sided weakness and dysarthria at baseline) ~ 1 year ago. Patient reports complianct with her asa and plavix. Patient presents from [facility] ED with worsening of her right sided weakness and aphasia w/ concern for new or worsening stroke. At [facility] EKG revealed sinus rhythm, baseline labs were unremarkable. U/a was benign. CT head revealed old left thalamic and left frontal CVA. CTA head and neck revealed possible high grade stenosis in distal petrous portion of the left ica, mild-moderate narrowing right ica. [facility] Stroke team contacted and arrangements made to transfer for higher level of care.   At time of my interview, patient states aphasia is improved, close to her previous baseline; still states right upper arm strength is below her baseline status. No fever. No chills. No dysuria.      Review of Systems   No f/c  No chest pain  No palpitations  No n/v/d  No dysuria  No rash    Personal History     Past Medical History:   Diagnosis Date    Diabetes mellitus     Hypertension     Stroke              Past Surgical History:   Procedure Laterality Date    APPENDECTOMY         Family History: family history is not on file.     Social History:  reports that she has been smoking cigarettes. She has a 2.50 pack-year smoking history. She has never used smokeless tobacco. She reports that she does not drink alcohol and does not use drugs.  Social History     Social History Narrative    Not on file       Medications:  Available home medication information reviewed.  Medications Prior to Admission   Medication  Sig Dispense Refill Last Dose    aspirin 81 MG chewable tablet Chew 1 tablet Daily.       atorvastatin (LIPITOR) 80 MG tablet Take 1 tablet by mouth Daily.       buPROPion XL (WELLBUTRIN XL) 150 MG 24 hr tablet Take 1 tablet by mouth Daily.       clopidogrel (PLAVIX) 75 MG tablet Take 1 tablet by mouth Daily.       cyclobenzaprine (FLEXERIL) 10 MG tablet Take 1 tablet by mouth Every Morning.       empagliflozin (Jardiance) 10 MG tablet tablet Take  by mouth Daily.       FLUoxetine (PROzac) 20 MG capsule Take 2 capsules by mouth Daily.       insulin aspart (novoLOG FLEXPEN) 100 UNIT/ML solution pen-injector sc pen Inject 5 Units under the skin into the appropriate area as directed 3 (Three) Times a Day With Meals.       Insulin Degludec (Tresiba) 100 UNIT/ML solution injection Inject 60 Units under the skin into the appropriate area as directed every night at bedtime.       losartan (COZAAR) 50 MG tablet Take 2 tablets by mouth Daily.       omeprazole (priLOSEC) 20 MG capsule Take 1 capsule by mouth Daily.       ondansetron (ZOFRAN) 4 MG tablet Take 1 tablet by mouth Every 6 (Six) Hours As Needed for Nausea or Vomiting.       pregabalin (LYRICA) 100 MG capsule Take 3 capsules by mouth every night at bedtime.          Allergies   Allergen Reactions    Insulin Nph (Human) (Isophane) Swelling    Yellow Jacket Venom Hives       Objective   Objective     Vital Signs:   Heart Rate:  [73] 73  BP: (152)/(83) 152/83  Total (NIH Stroke Scale): 4    Physical Exam   Constitutional:Alert, oriented x 3, nontoxic appearing  Psych:Normal/appropriate affect  HEENT:NCAT, oropharynx clear  Neck: neck supple, full range of motion  Neuro: right facial droop, dysarthria, right  2/5 (left 5/5); right leg raise 2/5  Cardiac: RRR; No pretibial pitting edema  Resp: CTAB, normal effort  GI: abd soft, nontender  Skin: No extremity rash  Musculoskeletal/extremities: no cyanosis of extremities; no significant ankle edema      Result  Review:  I have personally reviewed the results from the time of this admission to 6/5/2023 06:19 EDT and agree with these findings:  [x]  Laboratory list / accordion  []  Microbiology  [x]  Radiology  [x]  EKG/Telemetry   []  Cardiology/Vascular   []  Pathology  []  Old records  []  Other:  Most notable findings include: see hpi and below        LAB RESULTS:        Outside facility labs & EKG reviewed          Admission labs still pending at this time                          Microbiology Results (last 10 days)       ** No results found for the last 240 hours. **            CT Outside Films    Result Date: 6/5/2023  This procedure was auto-finalized with no dictation required.    CT Outside Films    Result Date: 6/5/2023  This procedure was auto-finalized with no dictation required.         Assessment & Plan   Assessment & Plan     Active Hospital Problems    Diagnosis  POA    **CVA (cerebral vascular accident) [I63.9]  Unknown    Right hemiparesis [G81.91]  Unknown    Aphasia [R47.01]  Unknown    History of stroke [Z86.73]  Not Applicable    DM (diabetes mellitus) [E11.9]  Unknown    HTN (hypertension) [I10]  Unknown    Hyperlipidemia [E78.5]  Unknown    Stroke [I63.9]  Yes       Worsening of baseline right hemiparesis, acute aphasia, improving  Hx previous left thalamic CVA w/ residual right sided weakness  Left ICA stenosis (noted on outside facility CT angiogram)  HTN  HL  -outside facility u/a benign; EKG was sinus  -s/p brilinta load at outside facility  -continue home asa, plavix, high dose statin for now  -further stroke orders per stroke navigator  -hold bp meds for permissive HTN at this point    DM2  -basal bolus insulin, check a1c    Labs: admission labs pending      DVT prophylaxis: sq lovenox      CODE STATUS:   Code Status and Medical Interventions:   Ordered at: 06/05/23 0619     Code Status (Patient has no pulse and is not breathing):    CPR (Attempt to Resuscitate)     Medical Interventions  (Patient has pulse or is breathing):    Full Support       Expected discharge date/ time has not been documented.     Colin Farooq MD  06/05/23

## 2023-06-05 NOTE — CASE MANAGEMENT/SOCIAL WORK
Discharge Planning Assessment  [facility]     Patient Name: Alma Mederos  MRN: [id number]  Today's Date: 6/5/2023    Admit Date: 6/5/2023    Plan: TBD   Discharge Needs Assessment       Row Name 06/05/23 1546       Living Environment    People in Home significant other    Current Living Arrangements home    Potentially Unsafe Housing Conditions none    Primary Care Provided by self    Provides Primary Care For no one    Family Caregiver if Needed significant other    Quality of Family Relationships supportive    Able to Return to Prior Arrangements yes       Resource/Environmental Concerns    Resource/Environmental Concerns none    Transportation Concerns none       Transition Planning    Patient/Family Anticipates Transition to home with family    Transportation Anticipated family or friend will provide       Discharge Needs Assessment    Readmission Within the Last 30 Days no previous admission in last 30 days    Equipment Currently Used at Home wheelchair;walker, edilberto;walker, rolling;cane, quad tip;shower chair    Anticipated Changes Related to Illness none                   Discharge Plan       Row Name 06/05/23 1549       Plan    Plan TBD    Patient/Family in Agreement with Plan yes    Plan Comments Spoke with Ms. Mederos at the bedside. She lives with her Significant other in [facility]. She is independent with ADL's but needs assistance with cooking and showering. She has a walker, wheelchair, edilberto walker, quad cane, gait belt, and shower seat. She does not use oxygen or HH. She does not have a POA. Her significant other can transport her at discharge. Her PCP is Alyce Sanchez and she has [employer] Medicare. If IPR is recommended by PT, patient is wanting to use [facility]. She was there in March for 30 days for IPR. CM will continue to follow up for discharge needs.    Final Discharge Disposition Code 30 - still a patient                  Continued Care and Services - Admitted Since 6/5/2023     Coordination has not been started for this encounter.       Expected Discharge Date and Time       Expected Discharge Date Expected Discharge Time    Jun 8, 2023            Demographic Summary       Row Name 06/05/23 1544       General Information    Admission Type observation    Arrived From home    Referral Source admission list    Reason for Consult discharge planning    Preferred Language English       Contact Information    Permission Granted to Share Info With                    Functional Status       Row Name 06/05/23 1545       Functional Status, IADL    Medications independent    Meal Preparation assistive person    Housekeeping independent    Laundry independent    Shopping independent       Mental Status    General Appearance WDL WDL       Mental Status Summary    Recent Changes in Mental Status/Cognitive Functioning no changes                   Psychosocial    No documentation.                  Abuse/Neglect    No documentation.                  Legal    No documentation.                  Substance Abuse    No documentation.                  Patient Forms    No documentation.                     Jesusita Ruiz, RN

## 2023-06-05 NOTE — CONSULTS
Stroke Consult Note    Patient Name: Alma Mederos   MRN: [id number]  Age: 52 y.o.  Sex: female  : 1970    Primary Care Physician: No primary care provider on file.  Referring Physician: Zane ORELLANA    TIME STROKE TEAM CALLED:  EST     TIME PATIENT SEEN:  EST    Handedness: Right  Race:     Chief Complaint/Reason for Consultation: Worsening of right upper extremity weakness, right lower extremity weakness, fall, slurred speech, aphasia.    HPI:   This is Alma Mederos as a 52-year-old white female, right-handed with significant health diagnosis for hypertension, hyperlipidemia, diabetes, previous left thalamic stroke with residual deficit right-sided weakness and dysarthria and right facial droop baseline, who presents to [facility] as a direct admit from [facility] for higher level of care.  Patient presented to the outside hospital for worsening of right-sided weakness, new onset of aphasia, worsening of slurred speech, frequent falls, swelling of right lower extremity that started Bia 3, 2023 6 PM.  At the outside hospital NIH 6, blood pressure 130/89, outside imaging CT head with no hemorrhage, CTA neck soft Plake within petrous portion of left ICA.  High-grade stenosis noted in the distal petrous portion of left internal carotid artery..  Left vertebral artery mild grade narrowing.  Outside hospital patient was loaded with Brilinta 180 mg, no P2 Y12 level reported.  Patient reports that she is compliant with all her medication.  Upon arrival to the floor and is alert and oriented not in acute distress, breathing comfortably on room air.  Denies any headache, dizziness, vision loss, difficulty finding words, new focal weakness, difficulty swallowing, patient is positive for slurred speech mild barely noticeable, right-sided weakness, right upper extremity spasticity, no decreased in sensation.  Patient denies any numbness or tingling.  Patient denies any chest pain or shortness of  breath.  Patient denies any GI or urinary symptoms.  Patient reports she is cane Randell walker, wheelchair dependent.  Images will be uploaded.  Last Known Normal Date/Time: Bia 3, 2023 6 PM EST     Review of Systems   Constitutional:  Negative for activity change and fever.   HENT:  Negative for voice change.    Eyes:  Negative for visual disturbance.   Respiratory:  Negative for chest tightness and shortness of breath.    Cardiovascular:  Negative for chest pain and palpitations.   Gastrointestinal:  Negative for nausea and vomiting.   Genitourinary:  Negative for dysuria.   Musculoskeletal:  Positive for gait problem.   Skin: Negative.    Neurological:  Positive for facial asymmetry, speech difficulty and weakness. Negative for seizures and numbness.   Psychiatric/Behavioral:  Negative for confusion.     Past Medical History:   Diagnosis Date    Diabetes mellitus     Hypertension     Stroke      Past Surgical History:   Procedure Laterality Date    APPENDECTOMY       History reviewed. No pertinent family history.  Social History     Socioeconomic History    Marital status: Single   Tobacco Use    Smoking status: Every Day     Packs/day: 0.50     Years: 5.00     Pack years: 2.50     Types: Cigarettes    Smokeless tobacco: Never   Vaping Use    Vaping Use: Never used   Substance and Sexual Activity    Alcohol use: Never    Drug use: Never    Sexual activity: Yes     Allergies   Allergen Reactions    Insulin Nph (Human) (Isophane) Swelling    Yellow Jacket Venom Hives     Prior to Admission medications    Not on File            Neurological Exam  Mental Status  Alert. Oriented to person, place and time. Recent and remote memory are intact. Speech is normal. Language is fluent with no aphasia. Attention and concentration are normal.    Cranial Nerves  CN II: Right visual acuity: Normal. Left visual acuity: Normal. Right normal visual field. Left normal visual field.  CN III, IV, VI: Extraocular movements intact  bilaterally. Normal lids and orbits bilaterally. Pupils equal round and reactive to light bilaterally.  CN V:  Right: Facial sensation is normal. Normal corneal reflex.  Left: Facial sensation is normal on the left. Normal corneal reflex.  CN VII:  Right: There is central facial weakness.  CN XI:  Right: Sternocleidomastoid strength is weak.  CN XII: Tongue midline without atrophy or fasciculations.    Motor   Increased muscle tone. There is increased tone and spasticity to right upper extremity.  Baseline right-sided weakness strength 3 out of 5 upper and lower extremities comparing to left side with.    Sensory  Light touch is normal in upper and lower extremities.     Reflexes                                            Right                      Left  Plantar                           Downgoing                Downgoing    Right pathological reflexes: Ankle clonus absent.  Left pathological reflexes: Ankle clonus absent.    Coordination  Right: Finger-to-nose abnormality: Heel-to-shin abnormality:Left: Finger-to-nose normal. Heel-to-shin normal.    Gait    Stand and pivot assist of 1.    Physical Exam  Constitutional:       General: She is not in acute distress.     Appearance: She is not ill-appearing.   HENT:      Head: Normocephalic and atraumatic.   Eyes:      General: Lids are normal.      Extraocular Movements: Extraocular movements intact.      Pupils: Pupils are equal, round, and reactive to light.   Cardiovascular:      Rate and Rhythm: Normal rate.      Pulses: Normal pulses.   Pulmonary:      Effort: Pulmonary effort is normal.      Comments: Breathing comfortable on room air  Abdominal:      Tenderness: There is no abdominal tenderness.   Musculoskeletal:         General: Deformity present.      Cervical back: Normal range of motion and neck supple.   Skin:     General: Skin is warm and dry.      Capillary Refill: Capillary refill takes less than 2 seconds.      Comments: There is a petechial rash  under patient's chin   Neurological:      General: No focal deficit present.      Mental Status: She is alert.   Psychiatric:         Mood and Affect: Mood normal.         Speech: Speech normal.         Behavior: Behavior normal.       Acute Stroke Data    Thrombolytic Inclusion / Exclusion Criteria    Time: 06:02 EDT  Person Administering Scale: JOSEPH Stack    Inclusion Criteria  [x]   18 years of age or greater   []   Onset of symptoms < 4.5 hours before beginning treatment (stroke onset = time patient was last seen well or without symptoms).   []   Diagnosis of acute ischemic stroke causing measurable disabling deficit (Complete Hemianopia, Any Aphasia, Visual or Sensory Extinction, Any weakness limiting sustained effort against gravity)   []   Any remaining deficit considered potentially disabling in view of patient and practitioner   Exclusion criteria (Do not proceed with Alteplase if any are checked under exclusion criteria)  [x]   Onset unknown or GREATER than 4.5 hours   []   ICH on CT/MRI   []   CT demonstrates hypodensity representing acute or subacute infarct   []   Significant head trauma or prior stroke in the previous 3 months   []   Symptoms suggestive of subarachnoid hemorrhage   []   History of un-ruptured intracranial aneurysm GREATER than 10 mm   []   Recent intracranial or intraspinal surgery within the last 3 months   []   Arterial puncture at a non-compressible site in the previous 7 days   []   Active internal bleeding   []   Acute bleeding tendency   []   Platelet count LESS than 100,000 for known hematological diseases such as leukemia, thrombocytopenia or chronic cirrhosis   []   Current use of anticoagulant with INR GREATER than 1.7 or PT GREATER than 15 seconds, aPTT GREATER than 40 seconds   []   Heparin received within 48 hours, resulting in abnormally elevated aPTT GREATER than upper limit of normal   []   Current use of direct thrombin inhibitors or direct factor Xa  inhibitors in the past 48 hours   []   Elevated blood pressure refractory to treatment (systolic GREATER than 185 mm/Hg or diastolic  GREATER than 110 mm/Hg   []   Suspected infective endocarditis and aortic arch dissection   []   Current use of therapeutic treatment dose of low-molecular-weight heparin (LMWH) within the previous 24 hours   []   Structural GI malignancy or bleed   Relative exclusion for all patients  []   Only minor non-disabling symptoms   []   Pregnancy   []   Seizure at onset with postictal residual neurological impairments   []   Major surgery or previous trauma within past 14 days   []   History of previous spontaneous ICH, intracranial neoplasm, or AV malformation   []   Postpartum (within previous 14 days)   []   Recent GI or urinary tract hemorrhage (within previous 21 days)   []   Recent acute MI (within previous 3 months)   []   History of un-ruptured intracranial aneurysm LESS than 10 mm   []   History of ruptured intracranial aneurysm   []   Blood glucose LESS than 50 mg/dL (2.7 mmol/L)   []   Dural puncture within the last 7 days   []   Known GREATER than 10 cerebral microbleeds   Additional exclusions for patients with symptoms onset between 3 and 4.5 hours.  []   Age > 80.   []   On any anticoagulants regardless of INR  >>> Warfarin (Coumadin), Heparin, Enoxaparin (Lovenox), fondaparinux (Arixtra), bivalirudin (Angiomax), Argatroban, dabigatran (Pradaxa), rivaroxaban (Xarelto), or apixaban (Eliquis)   []   Severe stroke (NIHSS > 25).   []   History of BOTH diabetes and previous ischemic stroke.   []   The risks and benefits have been discussed with the patient or family related to the administration of IV thrombolytic therapy for stroke symptoms.   []   I have discussed and reviewed the patient's case and imaging with the attending prior to IV thrombolytic therapy.   na Time IV thrombolytic administered       Hospital Meds:  Scheduled- aspirin, 325 mg, Oral, Daily   Or  aspirin, 300  mg, Rectal, Daily  atorvastatin, 80 mg, Oral, Nightly  clopidogrel, 75 mg, Oral, Daily  insulin lispro, 2-9 Units, Subcutaneous, 4x Daily AC & at Bedtime  lactated ringers, 1,000 mL, Intravenous, Once  sodium chloride, 10 mL, Intravenous, Q12H      Infusions-     PRNs-   dextrose    dextrose    glucagon (human recombinant)    sodium chloride    sodium chloride    Functional Status Prior to Current Stroke/Bledsoe Score: 1    NIH Stroke Scale  Time: 06:02 EDT  Person Administering Scale: JOSEPH Stack    Interval: baseline  1a. Level of Consciousness: 0-->Alert, keenly responsive  1b. LOC Questions: 0-->Answers both questions correctly  1c. LOC Commands: 0-->Performs both tasks correctly  2. Best Gaze: 0-->Normal  3. Visual: 0-->No visual loss  4. Facial Palsy: 1-->Minor paralysis (flattened nasolabial fold, asymmetry on smiling)  5a. Motor Arm, Left: 0-->No drift, limb holds 90 (or 45) degrees for full 10 secs  5b. Motor Arm, Right: 2-->Some effort against gravity, limb cannot get to or maintain (if cued) 90 (or 45) degrees, drifts down to bed, but has some effort against gravity  6a. Motor Leg, Left: 0-->No drift, leg holds 30 degree position for full 5 secs  6b. Motor Leg, Right: 1-->Drift, leg falls by the end of the 5-sec period but does not hit bed  7. Limb Ataxia: 0-->Absent  8. Sensory: 0-->Normal, no sensory loss  9. Best Language: 0-->No aphasia, normal  10. Dysarthria: 0-->Normal  11. Extinction and Inattention (formerly Neglect): 0-->No abnormality    Total (NIH Stroke Scale): 4        Results Reviewed:  I have personally reviewed current lab, radiology, and data and agree with results.  No radiology results for the last day            Assessment/Plan:    This is a 52-year-old white female, right-handed with significant health history for previous CVA with residual right side weakness, slurred speech, right facial droop baseline, hypertension, hyperlipidemia who presented as at direct admit from  outside hospital [facility] for higher level of care for worsening of right side weakness, aphasia.    Antiplatelet PTA: Aspirin, Plavix  Anticoagulant PTA: None        Worsening of right-side hemiparesis, new onset of acute aphasia, dysarthria, right facial droop related to left thalamic CVA.  Outside imaging CT head with no hemorrhage, CTA report with soft plaque within the petrous portion of the left ICA.  High-grade stenosis noted in the distal petrous portion of left ICA.  Mild grade narrowing with portion of left vertebral artery.  Mild to moderate grade narrowing within the proximal right ICA artery.  No LVO.    TIA/ischemic stroke without IV thrombolytic therapy order set in place  MRI stat ordered and pending  Patient was loaded at the outside hospital with Brilinta 180 mg, and aspirin  Discussed with Dr. Walter will continue therapy Brilinta 90 mg twice daily and aspirin 81 mg once daily, x90 days.  P2 Y12 level ordered and pending to see responsiveness  Lipid profile ordered and pending, LDL less than 70  A1c ordered and pending A1c goal less than 7  SLP/PT/OT evaluation  Neuro check, NIHSS per protocol  Systolic blood pressure goal 1 20-1 80, diastolic blood pressure less than 180  Cardene drip as needed for systolic blood pressure more than 180.  Case management for final discharge planning  Neurology stroke we will continue to follow-up  Stroke education is needed    Mild dysarthria  SLP evaluation    Right upper extremity spasticity  PT/OT evaluation  Possible needs arm brace band contracture      Reduced mobility, limitation of activity related to left thalamic stroke with residual right side weakness  Falls  Falls precaution  PT/OT evaluation  Case management for final discharge planning        Hypertension  Systolic blood pressure goal 1 20-1 80, diastolic blood pressure less than 100  Managed by medicine team    Hyperlipidemia  Lipid profile ordered and pending  Increase high intensity  statin Lipitor 80 mg at night and daily      Diabetes type 2  A1c ordered and pending  Managed by medicine team  Diabetes education is needed      I discussed plan of care with patient, hospitalist, and .  Neurology stroke we will continue to follow-up  Thank you for letting us participate in patient care.      Glenna Peng, JOSEPH  June 5, 2023  06:02 EDT

## 2023-06-05 NOTE — PLAN OF CARE
Goal Outcome Evaluation:  Plan of Care Reviewed With: patient        Progress: no change (OT IE)  Outcome Evaluation: OT evaluation completed. Pt presents w/ decreased I in ADLs, related t/fs, mobility compared to baseline limited by greater R side deficits since remote CVA (July 2022), muscle weakness, impaired balance and coordination and general recent decline. Pt requires A for all ADLs and upward of A x 2 for fxl transfers and mobility related to ADLs thus below baseline occupational performance. REcommend IPOT POC and IRF at d/c when medically ready.

## 2023-06-05 NOTE — PLAN OF CARE
Goal Outcome Evaluation:  Plan of Care Reviewed With: patient        Progress: no change          SLP evaluation completed. Will continue to address cognitive-linguistic deficits in tx. No further swallow intervention needed @ this time. Please see note for further details and recommendations.

## 2023-06-06 LAB
ALBUMIN SERPL-MCNC: 3.4 G/DL (ref 3.5–5.2)
ALBUMIN/GLOB SERPL: 1.3 G/DL
ALP SERPL-CCNC: 138 U/L (ref 39–117)
ALT SERPL W P-5'-P-CCNC: 17 U/L (ref 1–33)
ANION GAP SERPL CALCULATED.3IONS-SCNC: 11 MMOL/L (ref 5–15)
AST SERPL-CCNC: 18 U/L (ref 1–32)
BASOPHILS # BLD AUTO: 0.03 10*3/MM3 (ref 0–0.2)
BASOPHILS NFR BLD AUTO: 0.5 % (ref 0–1.5)
BILIRUB SERPL-MCNC: 0.3 MG/DL (ref 0–1.2)
BUN SERPL-MCNC: 11 MG/DL (ref 6–20)
BUN/CREAT SERPL: 14.7 (ref 7–25)
CALCIUM SPEC-SCNC: 8.6 MG/DL (ref 8.6–10.5)
CHLORIDE SERPL-SCNC: 104 MMOL/L (ref 98–107)
CO2 SERPL-SCNC: 24 MMOL/L (ref 22–29)
CREAT SERPL-MCNC: 0.75 MG/DL (ref 0.57–1)
DEPRECATED RDW RBC AUTO: 48 FL (ref 37–54)
EGFRCR SERPLBLD CKD-EPI 2021: 95.9 ML/MIN/1.73
EOSINOPHIL # BLD AUTO: 0.15 10*3/MM3 (ref 0–0.4)
EOSINOPHIL NFR BLD AUTO: 2.3 % (ref 0.3–6.2)
ERYTHROCYTE [DISTWIDTH] IN BLOOD BY AUTOMATED COUNT: 14 % (ref 12.3–15.4)
GLOBULIN UR ELPH-MCNC: 2.6 GM/DL
GLUCOSE BLDC GLUCOMTR-MCNC: 181 MG/DL (ref 70–130)
GLUCOSE BLDC GLUCOMTR-MCNC: 197 MG/DL (ref 70–130)
GLUCOSE BLDC GLUCOMTR-MCNC: 207 MG/DL (ref 70–130)
GLUCOSE BLDC GLUCOMTR-MCNC: 238 MG/DL (ref 70–130)
GLUCOSE SERPL-MCNC: 124 MG/DL (ref 65–99)
HCT VFR BLD AUTO: 37.1 % (ref 34–46.6)
HGB BLD-MCNC: 11.5 G/DL (ref 12–15.9)
IMM GRANULOCYTES # BLD AUTO: 0.02 10*3/MM3 (ref 0–0.05)
IMM GRANULOCYTES NFR BLD AUTO: 0.3 % (ref 0–0.5)
LYMPHOCYTES # BLD AUTO: 2.4 10*3/MM3 (ref 0.7–3.1)
LYMPHOCYTES NFR BLD AUTO: 36.4 % (ref 19.6–45.3)
MCH RBC QN AUTO: 28.8 PG (ref 26.6–33)
MCHC RBC AUTO-ENTMCNC: 31 G/DL (ref 31.5–35.7)
MCV RBC AUTO: 92.8 FL (ref 79–97)
MONOCYTES # BLD AUTO: 0.52 10*3/MM3 (ref 0.1–0.9)
MONOCYTES NFR BLD AUTO: 7.9 % (ref 5–12)
NEUTROPHILS NFR BLD AUTO: 3.48 10*3/MM3 (ref 1.7–7)
NEUTROPHILS NFR BLD AUTO: 52.6 % (ref 42.7–76)
NRBC BLD AUTO-RTO: 0 /100 WBC (ref 0–0.2)
PLATELET # BLD AUTO: 341 10*3/MM3 (ref 140–450)
PMV BLD AUTO: 10.7 FL (ref 6–12)
POTASSIUM SERPL-SCNC: 5 MMOL/L (ref 3.5–5.2)
PROT SERPL-MCNC: 6 G/DL (ref 6–8.5)
RBC # BLD AUTO: 4 10*6/MM3 (ref 3.77–5.28)
SODIUM SERPL-SCNC: 139 MMOL/L (ref 136–145)
WBC NRBC COR # BLD: 6.6 10*3/MM3 (ref 3.4–10.8)

## 2023-06-06 PROCEDURE — 63710000001 INSULIN LISPRO (HUMAN) PER 5 UNITS: Performed by: INTERNAL MEDICINE

## 2023-06-06 PROCEDURE — 99232 SBSQ HOSP IP/OBS MODERATE 35: CPT | Performed by: INTERNAL MEDICINE

## 2023-06-06 PROCEDURE — 96372 THER/PROPH/DIAG INJ SC/IM: CPT

## 2023-06-06 PROCEDURE — 80053 COMPREHEN METABOLIC PANEL: CPT | Performed by: INTERNAL MEDICINE

## 2023-06-06 PROCEDURE — 82948 REAGENT STRIP/BLOOD GLUCOSE: CPT

## 2023-06-06 PROCEDURE — 97116 GAIT TRAINING THERAPY: CPT

## 2023-06-06 PROCEDURE — G0378 HOSPITAL OBSERVATION PER HR: HCPCS

## 2023-06-06 PROCEDURE — 25010000002 ENOXAPARIN PER 10 MG: Performed by: INTERNAL MEDICINE

## 2023-06-06 PROCEDURE — 85025 COMPLETE CBC W/AUTO DIFF WBC: CPT | Performed by: INTERNAL MEDICINE

## 2023-06-06 PROCEDURE — 63710000001 INSULIN DETEMIR PER 5 UNITS: Performed by: INTERNAL MEDICINE

## 2023-06-06 PROCEDURE — 97110 THERAPEUTIC EXERCISES: CPT

## 2023-06-06 RX ORDER — ACETAMINOPHEN 325 MG/1
650 TABLET ORAL EVERY 6 HOURS PRN
Status: DISCONTINUED | OUTPATIENT
Start: 2023-06-06 | End: 2023-06-08 | Stop reason: HOSPADM

## 2023-06-06 RX ORDER — LOSARTAN POTASSIUM 50 MG/1
100 TABLET ORAL DAILY
Status: DISCONTINUED | OUTPATIENT
Start: 2023-06-06 | End: 2023-06-08 | Stop reason: HOSPADM

## 2023-06-06 RX ADMIN — ACETAMINOPHEN 650 MG: 325 TABLET ORAL at 09:42

## 2023-06-06 RX ADMIN — TICAGRELOR 90 MG: 90 TABLET ORAL at 20:02

## 2023-06-06 RX ADMIN — PREGABALIN 300 MG: 150 CAPSULE ORAL at 20:06

## 2023-06-06 RX ADMIN — INSULIN LISPRO 4 UNITS: 100 INJECTION, SOLUTION INTRAVENOUS; SUBCUTANEOUS at 12:18

## 2023-06-06 RX ADMIN — INSULIN LISPRO 4 UNITS: 100 INJECTION, SOLUTION INTRAVENOUS; SUBCUTANEOUS at 09:43

## 2023-06-06 RX ADMIN — Medication 10 ML: at 09:05

## 2023-06-06 RX ADMIN — PANTOPRAZOLE SODIUM 40 MG: 40 TABLET, DELAYED RELEASE ORAL at 05:38

## 2023-06-06 RX ADMIN — EMPAGLIFLOZIN 10 MG: 10 TABLET, FILM COATED ORAL at 09:43

## 2023-06-06 RX ADMIN — LOSARTAN POTASSIUM 100 MG: 50 TABLET, FILM COATED ORAL at 09:04

## 2023-06-06 RX ADMIN — BUPROPION HYDROCHLORIDE 150 MG: 150 TABLET, EXTENDED RELEASE ORAL at 09:43

## 2023-06-06 RX ADMIN — INSULIN LISPRO 2 UNITS: 100 INJECTION, SOLUTION INTRAVENOUS; SUBCUTANEOUS at 16:44

## 2023-06-06 RX ADMIN — Medication 10 ML: at 20:02

## 2023-06-06 RX ADMIN — ASPIRIN 81 MG CHEWABLE TABLET 81 MG: 81 TABLET CHEWABLE at 09:04

## 2023-06-06 RX ADMIN — INSULIN LISPRO 2 UNITS: 100 INJECTION, SOLUTION INTRAVENOUS; SUBCUTANEOUS at 21:07

## 2023-06-06 RX ADMIN — ATORVASTATIN CALCIUM 80 MG: 40 TABLET, FILM COATED ORAL at 20:02

## 2023-06-06 RX ADMIN — ENOXAPARIN SODIUM 40 MG: 100 INJECTION SUBCUTANEOUS at 09:04

## 2023-06-06 RX ADMIN — INSULIN DETEMIR 10 UNITS: 100 INJECTION, SOLUTION SUBCUTANEOUS at 09:04

## 2023-06-06 RX ADMIN — CYCLOBENZAPRINE 10 MG: 10 TABLET, FILM COATED ORAL at 20:06

## 2023-06-06 RX ADMIN — TICAGRELOR 90 MG: 90 TABLET ORAL at 09:48

## 2023-06-06 NOTE — THERAPY TREATMENT NOTE
Patient Name: Alma Mederos  : 1970    MRN: [id number]                              Today's Date: 2023       Admit Date: 2023    Visit Dx:     ICD-10-CM ICD-9-CM   1. Cerebrovascular accident (CVA), unspecified mechanism  I63.9 434.91   2. Cognitive communication deficit  R41.841 799.52     Patient Active Problem List   Diagnosis    Right hemiparesis    Aphasia    History of stroke    CVA (cerebral vascular accident)    DM (diabetes mellitus)    HTN (hypertension)    Hyperlipidemia    Stroke     Past Medical History:   Diagnosis Date    Diabetes mellitus     Hypertension     Stroke      Past Surgical History:   Procedure Laterality Date    APPENDECTOMY        General Information       Row Name 23 1525          Physical Therapy Time and Intention    Document Type therapy note (daily note)  -     Mode of Treatment physical therapy  -       Row Name 23 1525          General Information    Patient Profile Reviewed yes  -SS     Existing Precautions/Restrictions fall;other (see comments)  R side weakness, impaired sensation, spastic RUE, L visual deficits  -     Barriers to Rehab medically complex;previous functional deficit  -       Row Name 23 1525          Cognition    Orientation Status (Cognition) oriented x 4  -       Row Name 23 1525          Safety Issues, Functional Mobility    Safety Issues Affecting Function (Mobility) awareness of need for assistance;insight into deficits/self-awareness;judgment;positioning of assistive device;problem-solving;safety precaution awareness;safety precautions follow-through/compliance;sequencing abilities  -     Impairments Affecting Function (Mobility) balance;coordination;endurance/activity tolerance;postural/trunk control;strength;sensation/sensory awareness;motor control  -               User Key  (r) = Recorded By, (t) = Taken By, (c) = Cosigned By      Initials Name Provider Type    SS Penelope Lyn, PT Physical  Therapist                   Mobility       Row Name 06/06/23 1550          Bed Mobility    Bed Mobility scooting/bridging;supine-sit;sit-supine  -SS     Scooting/Bridging [place] (Bed Mobility) standby assist;verbal cues  -SS     Supine-Sit [place] (Bed Mobility) standby assist;verbal cues  -SS     Sit-Supine [place] (Bed Mobility) standby assist;verbal cues  -SS     Assistive Device (Bed Mobility) bed rails;head of bed elevated  -     Comment, (Bed Mobility) VC for sequencing, pt. requires increased time/effort  -       Row Name 06/06/23 1550          Sit-Stand Transfer    Sit-Stand [place] (Transfers) minimum assist (75% patient effort);verbal cues  -SS     Assistive Device (Sit-Stand Transfers) walker, edilberto  -SS     Comment, (Sit-Stand Transfer) VC for hand placement, appropriate alignment, lowering with eccentric control  -       Row Name 06/06/23 1550          Gait/Stairs (Locomotion)    [place] Level (Gait) 2 person assist;minimum assist (75% patient effort)  -     Assistive Device (Gait) walker, edilberto  -SS     Distance in Feet (Gait) 100  -SS     Deviations/Abnormal Patterns (Gait) stride length decreased;weight shifting decreased;gait speed decreased;bilateral deviations;fred decreased;ataxic  -     Bilateral Gait Deviations forward flexed posture;heel strike decreased  -     Comment, (Gait/Stairs) Pt. ambulated with a step through gait pattern w/mild ataxia noted and R toe out. VC for upright posture, forward gaze. Activity limited by fatigue.  -               User Key  (r) = Recorded By, (t) = Taken By, (c) = Cosigned By      Initials Name Provider Type     Penelope Lyn PT Physical Therapist                   Obj/Interventions       Row Name 06/06/23 5114          Motor Skills    Therapeutic Exercise hip;knee;ankle  -       Row Name 06/06/23 1552          Hip (Therapeutic Exercise)    Hip (Therapeutic Exercise) strengthening exercise;isometric exercises  -      Hip Isometrics (Therapeutic Exercise) bilateral;gluteal sets;10 repetitions  -     Hip Strengthening (Therapeutic Exercise) bilateral;aBduction;aDduction;heel slides;marching while seated;10 repetitions  -       Row Name 06/06/23 0546          Knee (Therapeutic Exercise)    Knee (Therapeutic Exercise) isometric exercises;strengthening exercise  -     Knee Isometrics (Therapeutic Exercise) bilateral;quad sets;10 repetitions  -     Knee Strengthening (Therapeutic Exercise) bilateral;SLR (straight leg raise);LAQ (long arc quad);10 repetitions  -       Row Name 06/06/23 5433          Ankle (Therapeutic Exercise)    Ankle (Therapeutic Exercise) AROM (active range of motion)  -     Ankle AROM (Therapeutic Exercise) bilateral;dorsiflexion;plantarflexion;10 repetitions  -       Row Name 06/06/23 4488          Balance    Balance Assessment sitting static balance;sitting dynamic balance;sit to stand dynamic balance;standing static balance;standing dynamic balance  -     Static Sitting Balance standby assist  -     Dynamic Sitting Balance contact guard  -     Position, Sitting Balance unsupported;sitting in chair  -     Sit to Stand Dynamic Balance minimal assist  -     Static Standing Balance 2-person assist;minimal assist  -SS     Dynamic Standing Balance 2-person assist;minimal assist  -SS               User Key  (r) = Recorded By, (t) = Taken By, (c) = Cosigned By      Initials Name Provider Type     Penelope Lyn PT Physical Therapist                   Goals/Plan       Row Name 06/06/23 1601          Transfer Goal 1 (PT)    Activity/Assistive Device (Transfer Goal 1, PT) bed-to-chair/chair-to-bed;sit-to-stand/stand-to-sit;walker, edilberto  -SS     [place] Level/Cues Needed (Transfer Goal 1, PT) modified independence  -     Time Frame (Transfer Goal 1, PT) long term goal (LTG);10 days  -     Progress/Outcome (Transfer Goal 1, PT) good progress toward goal;goal revised this date;goal  ongoing  -               User Key  (r) = Recorded By, (t) = Taken By, (c) = Cosigned By      Initials Name Provider Type     Penelope Lyn, PT Physical Therapist                   Clinical Impression       Row Name 06/06/23 3080          Pain    Pretreatment Pain Rating 0/10 - no pain  -SS     Posttreatment Pain Rating 0/10 - no pain  -     Pain Intervention(s) Repositioned;Ambulation/increased activity;Elevated  -     Additional Documentation Pain Scale: Numbers Pre/Post-Treatment (Group)  -       Row Name 06/06/23 0728          Plan of Care Review    Plan of Care Reviewed With patient  -SS     Progress improving  -     Outcome Evaluation Pt. presents below baseline function w/R sided weakness, balance deficits, and decreased functional endurance affecting her ability to safely participate in functional mobility. She performed bed mobility, transfers and ambulated 100' w/edilberto walker, min assist of 2. Activity limited by fatigue. She tolerated ther-ex well. Will continue IPPT to progress as tolerated. Recommend IPR upon discharge.  -       Row Name 06/06/23 0870          Therapy Assessment/Plan (PT)    Rehab Potential (PT) good, to achieve stated therapy goals  -     Criteria for Skilled Interventions Met (PT) yes;meets criteria;skilled treatment is necessary  -     Therapy Frequency (PT) daily  -       Row Name 06/06/23 0746          Vital Signs    Pre Systolic BP Rehab 113  -SS     Pre Treatment Diastolic BP 65  -SS     Post Systolic BP Rehab 138  -SS     Post Treatment Diastolic BP 66  -SS     Pretreatment Heart Rate (beats/min) 77  -SS     Posttreatment Heart Rate (beats/min) 81  -SS     Pre SpO2 (%) 99  -SS     O2 Delivery Pre Treatment room air  -SS     Post SpO2 (%) 100  -SS     O2 Delivery Post Treatment room air  -SS     Pre Patient Position Supine  -SS     Post Patient Position Supine  -       Row Name 06/06/23 5489          Positioning and Restraints    Pre-Treatment Position in bed   -SS     Post Treatment Position bed  -SS     In Bed notified nsg;fowlers;call light within reach;encouraged to call for assist;exit alarm on;legs elevated  -SS               User Key  (r) = Recorded By, (t) = Taken By, (c) = Cosigned By      Initials Name Provider Type    Penelope Reveles PT Physical Therapist                   Outcome Measures       Row Name 06/06/23 1602          How much help from another person do you currently need...    Turning from your back to your side while in flat bed without using bedrails? 3  -SS     Moving from lying on back to sitting on the side of a flat bed without bedrails? 3  -SS     Moving to and from a bed to a chair (including a wheelchair)? 3  -SS     Standing up from a chair using your arms (e.g., wheelchair, bedside chair)? 3  -SS     Climbing 3-5 steps with a railing? 2  -SS     To walk in hospital room? 2  -SS     AM-PAC 6 Clicks Score (PT) 16  -SS     Highest level of mobility 5 --> Static standing  -       Row Name 06/06/23 1602          Functional Assessment    Outcome Measure Options AM-PAC 6 Clicks Basic Mobility (PT)  -               User Key  (r) = Recorded By, (t) = Taken By, (c) = Cosigned By      Initials Name Provider Type    Penelope Reveles PT Physical Therapist                                 Physical Therapy Education       Title: PT OT SLP Therapies (In Progress)       Topic: Physical Therapy (Done)       Point: Mobility training (Done)       Learning Progress Summary             Patient ARLETH Cuadra VU,YASMIN,NR by SS at 6/6/2023 1602    Comment: Reviewed safety/technique w/bed mobility, transfers, ambulation, HEP, PT POC, benefits of eccentric control and joint preservation    Acceptance, E, VU,NR by CD at 6/5/2023 1326    Comment: BENEFITS OF OOB ACTIVITY, SAFETY WITH MOBILITY, PROGRESSION OF POC, D/C PLANNING,                         Point: Home exercise program (Done)       Learning Progress Summary             Patient ARLETH Cuadra, JEFF,YASMIN,NR by SS at  6/6/2023 1602    Comment: Reviewed safety/technique w/bed mobility, transfers, ambulation, HEP, PT POC, benefits of eccentric control and joint preservation    Acceptance, E, VU,NR by CD at 6/5/2023 1326    Comment: BENEFITS OF OOB ACTIVITY, SAFETY WITH MOBILITY, PROGRESSION OF POC, D/C PLANNING,                         Point: Body mechanics (Done)       Learning Progress Summary             Patient Eager, E, VU,DU,NR by  at 6/6/2023 1602    Comment: Reviewed safety/technique w/bed mobility, transfers, ambulation, HEP, PT POC, benefits of eccentric control and joint preservation    Acceptance, E, VU,NR by CD at 6/5/2023 1326    Comment: BENEFITS OF OOB ACTIVITY, SAFETY WITH MOBILITY, PROGRESSION OF POC, D/C PLANNING,                         Point: Precautions (Done)       Learning Progress Summary             Patient Eager, E, VU,DU,NR by  at 6/6/2023 1602    Comment: Reviewed safety/technique w/bed mobility, transfers, ambulation, HEP, PT POC, benefits of eccentric control and joint preservation    Acceptance, E, VU,NR by CD at 6/5/2023 1326    Comment: BENEFITS OF OOB ACTIVITY, SAFETY WITH MOBILITY, PROGRESSION OF POC, D/C PLANNING,                                         User Key       Initials Effective Dates Name Provider Type Discipline     02/03/23 -  Romelia Cesar, PT Physical Therapist PT     06/01/21 -  Penelope Lyn, BRIAN Physical Therapist PT                  PT Recommendation and Plan     Plan of Care Reviewed With: patient  Progress: improving  Outcome Evaluation: Pt. presents below baseline function w/R sided weakness, balance deficits, and decreased functional endurance affecting her ability to safely participate in functional mobility. She performed bed mobility, transfers and ambulated 100' w/edilberto walker, min assist of 2. Activity limited by fatigue. She tolerated ther-ex well. Will continue IPPT to progress as tolerated. Recommend IPR upon discharge.     Time Calculation:    PT Charges        Row Name 06/06/23 1603             Time Calculation    Start Time 1500  -SS      Stop Time 1523  -SS      Time Calculation (min) 23 min  -SS      PT Received On 06/06/23  -SS         Time Calculation- PT    Total Timed Code Minutes- PT 23 minute(s)  -SS         Timed Charges    43888 - PT Therapeutic Exercise Minutes 10  -SS      76944 - Gait Training Minutes  10  -SS      85672 - PT Therapeutic Activity Minutes 3  -SS         Total Minutes    Timed Charges Total Minutes 23  -SS       Total Minutes 23  -SS                User Key  (r) = Recorded By, (t) = Taken By, (c) = Cosigned By      Initials Name Provider Type    SS Penelope Lyn, PT Physical Therapist                  Therapy Charges for Today       Code Description Service Date Service Provider Modifiers Qty    20299038759 HC PT THER PROC EA 15 MIN 6/6/2023 Penelope Lyn, PT GP 1    56541186247 HC GAIT TRAINING EA 15 MIN 6/6/2023 Penelope Lyn, PT GP 1    04916837096 HC PT THER SUPP EA 15 MIN 6/6/2023 Penelope Lyn, PT GP 2            PT G-Codes  Outcome Measure Options: AM-PAC 6 Clicks Basic Mobility (PT)  AM-PAC 6 Clicks Score (PT): 16  AM-PAC 6 Clicks Score (OT): 16  PT Discharge Summary  Anticipated Discharge Disposition (PT): inpatient rehabilitation facility    Penelope Lyn PT  6/6/2023

## 2023-06-06 NOTE — PLAN OF CARE
Goal Outcome Evaluation:  Plan of Care Reviewed With: patient        Progress: improving  Outcome Evaluation: Pt. presents below baseline function w/R sided weakness, balance deficits, and decreased functional endurance affecting her ability to safely participate in functional mobility. She performed bed mobility, transfers and ambulated 100' w/edilberto walker, min assist of 2. Activity limited by fatigue. She tolerated ther-ex well. Will continue IPPT to progress as tolerated. Recommend IPR upon discharge.

## 2023-06-06 NOTE — CONSULTS
Diabetes Education  Assessment/Teaching    Patient Name:  Alma Mederos  YOB: 1970  MRN: [id number]  Admit Date:  6/5/2023      Assessment Date:  6/6/2023  Flowsheet Row Most Recent Value   General Information     Referral From: Other (comment)  [Per stroke protocol]   Pregnancy Assessment    Diabetes History    What type of diabetes do you have? Type 1  [Pt. reports was diagnosed at age 26 with Type 1 DM]   Length of Diabetes Diagnosis 10 + years   Current DM knowledge good   Have you had diabetes education/teaching in the past? yes   When and where was your diabetes education? At diagnosis   Do you test your blood sugar at home? no   Do you have any diabetes complications? retinopathy, neuropathy   Education Preferences    What areas of diabetes would you like to learn about? other (comment)  [States nothing at this time]   Nutrition Information    Assessment Topics    Problem Solving - Assessment Needs education   Reducing Risk - Assessment Needs education   Healthy Coping - Assessment Needs education   DM Goals             Flowsheet Row Most Recent Value   DM Education Needs    Meter Has own   Frequency of Testing --  [States does not check BG at home, does have a meter]   Blood Glucose Target Range Ranges per ADA guidelines   Medication Insulin, Pen   Problem Solving Hypoglycemia, Hyperglycemia, Sick days, Signs, Symptoms, Treatment   Reducing Risks A1C testing, Lipids, Blood pressure, Eye exam, Foot care, Cardiovascular, Neuropathy, Retinopathy   Physical Activity Walking   Physical Activity Frequency Occasionally   Healthy Coping Appropriate   Discharge Plan Home   Motivation Moderate   Teaching Method Explanation, Discussion, Handouts, Teach back   Patient Response Needs reinforcement          Ms. Mederos gave permission for diabetes education. She reports she was diagnosed with Type 1 DM at the age of 26. Her current A1c is 10.8%. She states it had been 14% and that she usually does run high.  She states she takes tresiba, novolog, and jardiance. Reports she never misses any doses. She does not check her BG at home. States she has been on an insulin pump about 10 years ago and has used a [employer] CGM in the past. She stopped using the [employer] due to insurance issues, but now has Medicare. Discussed importance of regularly checking her BG and potentially going back to using a CGM again. Encouraged her to check with her health insurance to see if she has the coverage to cover a CGM. To follow up with her PCP to get a prescription for the device and supplies. In the meantime to get new strips for her meter and check her BG at home.   She reports she is hospitalized with a CVA and also had one about a year ago. She states she also has neuropathy and retinopathy and had a torn retina in the past. Discussed these complications may be a result of her uncontrolled diabetes and routinely checking her BG is a tool that can help her get back in control and prevent any further complications.   Reviewed T1DM self management. Discussed target blood glucose readings and A1c goals per ADA were reviewed. Reviewed current A1c and discussed its significance. Signs, symptoms and treatment of hyperglycemia and hypoglycemia were discussed. Lifestyle changes such as physical activity with MD approval and healthy eating were encouraged.    Patient has been scheduled for outpatient diabetes education follow up visit on 6/23/23 at 9:15 AM. Outpatient staff will provide reminder call prior to appointment. Patient was given reminder card with date and time of appointment and our contact information. Thank you for this referra    Electronically signed by:  Stella Barrios RN [facility]  06/06/23 11:25 EDT

## 2023-06-06 NOTE — CASE MANAGEMENT/SOCIAL WORK
Continued Stay Note  GERALDINE Garcia     Patient Name: Alma Mederos  MRN: [id number]  Today's Date: 6/6/2023    Admit Date: 6/5/2023    Plan: SNF   Discharge Plan       Row Name 06/06/23 9005       Plan    Plan SNF    Patient/Family in Agreement with Plan yes    Plan Comments PT is recommending IPR. Spoke with patient and she wants to go to [facility] when medically ready for discharge. Referral sent to Doreen on 6/6. She will need a prior auth for her [employer] Medicare. CM will continue to follow.    Final Discharge Disposition Code 03 - skilled nursing facility (SNF)                   Discharge Codes    No documentation.                 Expected Discharge Date and Time       Expected Discharge Date Expected Discharge Time    Jun 8, 2023               Jesusita Ruiz RN

## 2023-06-06 NOTE — PROGRESS NOTES
[facility] Medicine Services  PROGRESS NOTE    Patient Name: Alma Mederos  : 1970  MRN: [id number]    Date of Admission: 2023  Primary Care Physician: Alyce Sanchez APRN    Subjective   Subjective     CC:  stroke    HPI:    Feels like speech and weakness is better.     ROS:  Gen- No fevers, chills  CV- No chest pain, palpitations  Resp- No cough, dyspnea  GI- No N/V/D, abd pain        Objective   Objective     Vital Signs:   Temp:  [97.7 °F (36.5 °C)-98 °F (36.7 °C)] 98 °F (36.7 °C)  Heart Rate:  [69-83] 69  Resp:  [16] 16  BP: (124-166)/(60-80) 147/79     Physical Exam:  GEN: NAD, resting in bed, awake  HEENT: on room air, atraumatic, normocephalic, poor dentition, speech clear  NECK: supple, no masses  RESP: on room air, normal effort  CV: on tele, sinus rhythm  PSYCH: normal affect, appropriate  NEURO: awake, alert, right sided weakness  MSK: no edema noted  SKIN: no rashes noted         Results Reviewed:  LAB RESULTS:      Lab 23  0647   WBC 6.60 7.54   HEMOGLOBIN 11.5* 11.6*   HEMATOCRIT 37.1 36.6   PLATELETS 341 318   NEUTROS ABS 3.48 3.73   IMMATURE GRANS (ABS) 0.02 0.02   LYMPHS ABS 2.40 2.97   MONOS ABS 0.52 0.58   EOS ABS 0.15 0.19   MCV 92.8 92.0         Lab 236 239 23  0647   SODIUM 139  --  139   POTASSIUM 5.0 4.4 3.0*   CHLORIDE 104  --  103   CO2 24.0  --  24.0   ANION GAP 11.0  --  12.0   BUN 11  --  15   CREATININE 0.75  --  0.57   EGFR 95.9  --  109.5   GLUCOSE 124*  --  118*   CALCIUM 8.6  --  9.0   MAGNESIUM  --   --  1.9   HEMOGLOBIN A1C  --   --  10.80*         Lab 23  0456 23  0647   TOTAL PROTEIN 6.0 6.6   ALBUMIN 3.4* 3.6   GLOBULIN 2.6 3.0   ALT (SGPT) 17 16   AST (SGOT) 18 17   BILIRUBIN 0.3 0.3   ALK PHOS 138* 138*             Lab 23  0647   CHOLESTEROL 138   LDL CHOL 62   HDL CHOL 62*   TRIGLYCERIDES 67             Brief Urine Lab Results       None            Microbiology Results  Abnormal       None            Adult Transthoracic Echo Complete W/ Cont if Necessary Per Protocol (With Agitated Saline)    Result Date: 6/5/2023    Left ventricular systolic function is normal. Calculated left ventricular EF = 64%   Saline test results are negative.     MRI Brain Without Contrast    Result Date: 6/5/2023  MRI BRAIN WO CONTRAST Date of Exam: 6/5/2023 7:10 AM EDT Indication: Stroke, follow up.  Comparison: None available. Technique:  Routine multiplanar/multisequence sequence images of the brain were obtained without contrast administration. Findings: Mildly motion-degraded examination. Parenchyma: On diffusion-weighted images there is increased signal along the left corona radiata without ADC correlate hypointensity. There is associated FLAIR hyperintensity in this area and a small focus of associated encephalomalacia/cystic change. No  evidence of hemorrhage. Midline structures appear grossly intact. No midline shift or herniation. Mild parenchymal volume loss. Few scattered periventricular and subcortical FLAIR hyperintensities are nonspecific but often associated with chronic small vessel ischemic changes. Ventricles and extra-axial spaces: Mildly prominent ventricles and sulci secondary to volume loss. No extra-axial fluid collections. Other: Normal calvarial marrow signal. Fluid within the left posterior orbit. Scattered paranasal sinus mucosal thickening. Trace bilateral mastoid effusions. Major intracranial flow voids appear intact.     Impression: Impression: Mildly motion-degraded exam. Increased signal on DWI without ADC correlate and with mild FLAIR hyperintensity in the left corona radiata most likely representing subacute infarct. Fluid seen within the left posterior globe concerning for retinal detachment and hemorrhage. Recommend correlation with ophthalmologic exam. Additional chronic findings as above. Findings discussed with stroke team by Dr. Jack Claudio via telephone on  6/5/2023 7:53 AM EDT. Electronically Signed: Jack Claudio  6/5/2023 8:03 AM EDT  Workstation ID: [id number]    CT Outside Films    Result Date: 6/5/2023  This procedure was auto-finalized with no dictation required.    CT Outside Films    Result Date: 6/5/2023  This procedure was auto-finalized with no dictation required.     Results for orders placed during the hospital encounter of 06/05/23    Adult Transthoracic Echo Complete W/ Cont if Necessary Per Protocol (With Agitated Saline)    Interpretation Summary    Left ventricular systolic function is normal. Calculated left ventricular EF = 64%    Saline test results are negative.      Current medications:  Scheduled Meds:aspirin, , ,   aspirin, 81 mg, Oral, Daily  aspirin, 300 mg, Rectal, Daily  atorvastatin, 80 mg, Oral, Nightly  buPROPion XL, 150 mg, Oral, Daily  empagliflozin, 10 mg, Oral, Daily  enoxaparin, 40 mg, Subcutaneous, Daily  insulin detemir, 10 Units, Subcutaneous, Daily  insulin lispro, 2-9 Units, Subcutaneous, 4x Daily AC & at Bedtime  lactated ringers, 1,000 mL, Intravenous, Once  pantoprazole, 40 mg, Oral, Q AM  senna-docusate sodium, 2 tablet, Oral, BID  sodium chloride, 10 mL, Intravenous, Q12H  ticagrelor, 60 mg, Oral, BID      Continuous Infusions:niCARdipine, 5-15 mg/hr, Last Rate: Stopped (06/05/23 1000)      PRN Meds:.  senna-docusate sodium **AND** polyethylene glycol **AND** bisacodyl **AND** bisacodyl    Calcium Replacement - Follow Nurse / BPA Driven Protocol    cyclobenzaprine    dextrose    dextrose    glucagon (human recombinant)    Magnesium Standard Dose Replacement - Follow Nurse / BPA Driven Protocol    ondansetron    Phosphorus Replacement - Follow Nurse / BPA Driven Protocol    Potassium Replacement - Follow Nurse / BPA Driven Protocol    pregabalin    sodium chloride    sodium chloride    Assessment & Plan   Assessment & Plan     Active Hospital Problems    Diagnosis  POA    **CVA (cerebral vascular accident) [I63.9]   Unknown    Right hemiparesis [G81.91]  Unknown    Aphasia [R47.01]  Unknown    History of stroke [Z86.73]  Not Applicable    DM (diabetes mellitus) [E11.9]  Unknown    HTN (hypertension) [I10]  Unknown    Hyperlipidemia [E78.5]  Unknown    Stroke [I63.9]  Yes      Resolved Hospital Problems   No resolved problems to display.        Brief Hospital Course to date:  Alma Mederos is a 52 y.o. female w/ hx HTN, HL, DM2, previous CVA (w/ residual right sided weakness and dysarthria at baseline) ~ 1 year ago. Patient reports complianct with her asa and plavix. Patient presents from [facility] ED with worsening of her right sided weakness and aphasia w/ concern for new or worsening stroke. At [facility] EKG revealed sinus rhythm, baseline labs were unremarkable. U/a was benign. CT head revealed old left thalamic and left frontal CVA. CTA head and neck revealed possible high grade stenosis in distal petrous portion of the left ica, mild-moderate narrowing right ica. [facility] Stroke team contacted and arrangements made to transfer for higher level of care.     This patient's problems and plans were partially entered by my partner and updated as appropriate by me 06/06/23.        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    --have reviewed neurology recs with normalized BP goals, recommend brilitana/ASA x 3 weeks then brilinta monotherapy.    --could consider cerebral angiogram for worsening symptoms   --needs prn f/u in stroke clinic    DM2  -basal bolus insulin, A1C 10.8     HypoK  -will put in replacement protocol     Retinal Detachment- history of   --noted on imaging, d/w patient. She has h/o left sided retinal detachment/hemorrhage and has had surgery at  approx 3 years ago. She notes no new visual symptoms today. This is a chronic finding          DVT prophylaxis: sq lovenox       Expected Discharge Location and Transportation: needs rehab and is willing  Expected Discharge   Expected Discharge Date: 6/8/2023; Expected Discharge Time:      DVT prophylaxis:  Medical and mechanical DVT prophylaxis orders are present.     AM-PAC 6 Clicks Score (PT): 13 (06/05/23 3985)    CODE STATUS:   Code Status and Medical Interventions:   Ordered at: 06/05/23 0619     Code Status (Patient has no pulse and is not breathing):    CPR (Attempt to Resuscitate)     Medical Interventions (Patient has pulse or is breathing):    Full Support       Penelope Trevizo MD  06/06/23

## 2023-06-07 LAB
ALBUMIN SERPL-MCNC: 3.7 G/DL (ref 3.5–5.2)
ALBUMIN/GLOB SERPL: 1.2 G/DL
ALP SERPL-CCNC: 143 U/L (ref 39–117)
ALT SERPL W P-5'-P-CCNC: 18 U/L (ref 1–33)
ANION GAP SERPL CALCULATED.3IONS-SCNC: 10 MMOL/L (ref 5–15)
AST SERPL-CCNC: 16 U/L (ref 1–32)
BASOPHILS # BLD AUTO: 0.05 10*3/MM3 (ref 0–0.2)
BASOPHILS NFR BLD AUTO: 0.8 % (ref 0–1.5)
BILIRUB SERPL-MCNC: 0.3 MG/DL (ref 0–1.2)
BUN SERPL-MCNC: 16 MG/DL (ref 6–20)
BUN/CREAT SERPL: 21.1 (ref 7–25)
CALCIUM SPEC-SCNC: 9.1 MG/DL (ref 8.6–10.5)
CHLORIDE SERPL-SCNC: 99 MMOL/L (ref 98–107)
CO2 SERPL-SCNC: 27 MMOL/L (ref 22–29)
CREAT SERPL-MCNC: 0.76 MG/DL (ref 0.57–1)
DEPRECATED RDW RBC AUTO: 45.9 FL (ref 37–54)
EGFRCR SERPLBLD CKD-EPI 2021: 94.4 ML/MIN/1.73
EOSINOPHIL # BLD AUTO: 0.14 10*3/MM3 (ref 0–0.4)
EOSINOPHIL NFR BLD AUTO: 2.1 % (ref 0.3–6.2)
ERYTHROCYTE [DISTWIDTH] IN BLOOD BY AUTOMATED COUNT: 14 % (ref 12.3–15.4)
GLOBULIN UR ELPH-MCNC: 3.2 GM/DL
GLUCOSE BLDC GLUCOMTR-MCNC: 216 MG/DL (ref 70–130)
GLUCOSE BLDC GLUCOMTR-MCNC: 251 MG/DL (ref 70–130)
GLUCOSE BLDC GLUCOMTR-MCNC: 254 MG/DL (ref 70–130)
GLUCOSE SERPL-MCNC: 257 MG/DL (ref 65–99)
HCT VFR BLD AUTO: 36.7 % (ref 34–46.6)
HGB BLD-MCNC: 11.6 G/DL (ref 12–15.9)
IMM GRANULOCYTES # BLD AUTO: 0.02 10*3/MM3 (ref 0–0.05)
IMM GRANULOCYTES NFR BLD AUTO: 0.3 % (ref 0–0.5)
LYMPHOCYTES # BLD AUTO: 1.66 10*3/MM3 (ref 0.7–3.1)
LYMPHOCYTES NFR BLD AUTO: 25.3 % (ref 19.6–45.3)
MCH RBC QN AUTO: 28.4 PG (ref 26.6–33)
MCHC RBC AUTO-ENTMCNC: 31.6 G/DL (ref 31.5–35.7)
MCV RBC AUTO: 89.7 FL (ref 79–97)
MONOCYTES # BLD AUTO: 0.47 10*3/MM3 (ref 0.1–0.9)
MONOCYTES NFR BLD AUTO: 7.2 % (ref 5–12)
NEUTROPHILS NFR BLD AUTO: 4.22 10*3/MM3 (ref 1.7–7)
NEUTROPHILS NFR BLD AUTO: 64.3 % (ref 42.7–76)
NRBC BLD AUTO-RTO: 0 /100 WBC (ref 0–0.2)
PLATELET # BLD AUTO: 376 10*3/MM3 (ref 140–450)
PMV BLD AUTO: 10.5 FL (ref 6–12)
POTASSIUM SERPL-SCNC: 4.5 MMOL/L (ref 3.5–5.2)
PROT SERPL-MCNC: 6.9 G/DL (ref 6–8.5)
RBC # BLD AUTO: 4.09 10*6/MM3 (ref 3.77–5.28)
SODIUM SERPL-SCNC: 136 MMOL/L (ref 136–145)
WBC NRBC COR # BLD: 6.56 10*3/MM3 (ref 3.4–10.8)

## 2023-06-07 PROCEDURE — 63710000001 INSULIN DETEMIR PER 5 UNITS: Performed by: INTERNAL MEDICINE

## 2023-06-07 PROCEDURE — 85025 COMPLETE CBC W/AUTO DIFF WBC: CPT | Performed by: INTERNAL MEDICINE

## 2023-06-07 PROCEDURE — 99232 SBSQ HOSP IP/OBS MODERATE 35: CPT | Performed by: INTERNAL MEDICINE

## 2023-06-07 PROCEDURE — G0378 HOSPITAL OBSERVATION PER HR: HCPCS

## 2023-06-07 PROCEDURE — 63710000001 INSULIN LISPRO (HUMAN) PER 5 UNITS: Performed by: INTERNAL MEDICINE

## 2023-06-07 PROCEDURE — 80053 COMPREHEN METABOLIC PANEL: CPT | Performed by: INTERNAL MEDICINE

## 2023-06-07 PROCEDURE — 82948 REAGENT STRIP/BLOOD GLUCOSE: CPT

## 2023-06-07 PROCEDURE — 25010000002 ENOXAPARIN PER 10 MG: Performed by: INTERNAL MEDICINE

## 2023-06-07 PROCEDURE — 96372 THER/PROPH/DIAG INJ SC/IM: CPT

## 2023-06-07 RX ORDER — CHOLECALCIFEROL (VITAMIN D3) 125 MCG
5 CAPSULE ORAL NIGHTLY PRN
Status: DISCONTINUED | OUTPATIENT
Start: 2023-06-07 | End: 2023-06-08 | Stop reason: HOSPADM

## 2023-06-07 RX ADMIN — PREGABALIN 300 MG: 150 CAPSULE ORAL at 20:10

## 2023-06-07 RX ADMIN — INSULIN LISPRO 6 UNITS: 100 INJECTION, SOLUTION INTRAVENOUS; SUBCUTANEOUS at 09:40

## 2023-06-07 RX ADMIN — PANTOPRAZOLE SODIUM 40 MG: 40 TABLET, DELAYED RELEASE ORAL at 06:24

## 2023-06-07 RX ADMIN — ENOXAPARIN SODIUM 40 MG: 100 INJECTION SUBCUTANEOUS at 09:31

## 2023-06-07 RX ADMIN — TICAGRELOR 90 MG: 90 TABLET ORAL at 20:10

## 2023-06-07 RX ADMIN — INSULIN DETEMIR 10 UNITS: 100 INJECTION, SOLUTION SUBCUTANEOUS at 09:31

## 2023-06-07 RX ADMIN — BUPROPION HYDROCHLORIDE 150 MG: 150 TABLET, EXTENDED RELEASE ORAL at 09:31

## 2023-06-07 RX ADMIN — Medication 10 ML: at 09:32

## 2023-06-07 RX ADMIN — EMPAGLIFLOZIN 10 MG: 10 TABLET, FILM COATED ORAL at 09:31

## 2023-06-07 RX ADMIN — ATORVASTATIN CALCIUM 80 MG: 40 TABLET, FILM COATED ORAL at 20:10

## 2023-06-07 RX ADMIN — TICAGRELOR 90 MG: 90 TABLET ORAL at 09:39

## 2023-06-07 RX ADMIN — CYCLOBENZAPRINE 10 MG: 10 TABLET, FILM COATED ORAL at 20:10

## 2023-06-07 RX ADMIN — INSULIN LISPRO 4 UNITS: 100 INJECTION, SOLUTION INTRAVENOUS; SUBCUTANEOUS at 17:23

## 2023-06-07 RX ADMIN — ASPIRIN 81 MG CHEWABLE TABLET 81 MG: 81 TABLET CHEWABLE at 09:31

## 2023-06-07 RX ADMIN — LOSARTAN POTASSIUM 100 MG: 50 TABLET, FILM COATED ORAL at 09:31

## 2023-06-07 RX ADMIN — Medication 10 ML: at 20:10

## 2023-06-07 RX ADMIN — Medication 5 MG: at 20:10

## 2023-06-07 RX ADMIN — INSULIN LISPRO 6 UNITS: 100 INJECTION, SOLUTION INTRAVENOUS; SUBCUTANEOUS at 20:19

## 2023-06-07 RX ADMIN — Medication 5 MG: at 01:26

## 2023-06-07 NOTE — PROGRESS NOTES
[facility] Medicine Services  PROGRESS NOTE    Patient Name: Alma Mederos  : 1970  MRN: [id number]    Date of Admission: 2023  Primary Care Physician: Alyce aSnchez APRN    Subjective   Subjective     CC:  stroke    HPI:    Doing fine. Patient is awaiting rehab at this time     ROS:  Gen- No fevers, chills  CV- No chest pain, palpitations  Resp- No cough, dyspnea  GI- No N/V/D, abd pain        Objective   Objective     Vital Signs:   Temp:  [97.7 °F (36.5 °C)-98.2 °F (36.8 °C)] 97.7 °F (36.5 °C)  Heart Rate:  [63-93] 63  Resp:  [16-18] 16  BP: (110-186)/(44-95) 127/66     Physical Exam:  GEN: NAD, resting in bed, awake  HEENT: on room air, atraumatic, normocephalic, poor dentition, speech clear  NECK: supple, no masses  RESP: on room air, normal effort  CV: on tele, sinus rhythm  PSYCH: normal affect, appropriate  NEURO: awake, alert, right sided weakness  MSK: no edema noted  SKIN: no rashes noted         Results Reviewed:  LAB RESULTS:      Lab 23  0934 23  0456 23  0647   WBC 6.56 6.60 7.54   HEMOGLOBIN 11.6* 11.5* 11.6*   HEMATOCRIT 36.7 37.1 36.6   PLATELETS 376 341 318   NEUTROS ABS 4.22 3.48 3.73   IMMATURE GRANS (ABS) 0.02 0.02 0.02   LYMPHS ABS 1.66 2.40 2.97   MONOS ABS 0.47 0.52 0.58   EOS ABS 0.14 0.15 0.19   MCV 89.7 92.8 92.0         Lab 23  0456 23  2249 23  0647   SODIUM 139  --  139   POTASSIUM 5.0 4.4 3.0*   CHLORIDE 104  --  103   CO2 24.0  --  24.0   ANION GAP 11.0  --  12.0   BUN 11  --  15   CREATININE 0.75  --  0.57   EGFR 95.9  --  109.5   GLUCOSE 124*  --  118*   CALCIUM 8.6  --  9.0   MAGNESIUM  --   --  1.9   HEMOGLOBIN A1C  --   --  10.80*         Lab 23  0456 23  0647   TOTAL PROTEIN 6.0 6.6   ALBUMIN 3.4* 3.6   GLOBULIN 2.6 3.0   ALT (SGPT) 17 16   AST (SGOT) 18 17   BILIRUBIN 0.3 0.3   ALK PHOS 138* 138*             Lab 23  0647   CHOLESTEROL 138   LDL CHOL 62   HDL CHOL 62*   TRIGLYCERIDES  67             Brief Urine Lab Results       None            Microbiology Results Abnormal       None            No radiology results from the last 24 hrs    Results for orders placed during the hospital encounter of 06/05/23    Adult Transthoracic Echo Complete W/ Cont if Necessary Per Protocol (With Agitated Saline)    Interpretation Summary    Left ventricular systolic function is normal. Calculated left ventricular EF = 64%    Saline test results are negative.      Current medications:  Scheduled Meds:aspirin, , ,   aspirin, 81 mg, Oral, Daily  atorvastatin, 80 mg, Oral, Nightly  buPROPion XL, 150 mg, Oral, Daily  empagliflozin, 10 mg, Oral, Daily  enoxaparin, 40 mg, Subcutaneous, Daily  insulin detemir, 10 Units, Subcutaneous, Daily  insulin lispro, 2-9 Units, Subcutaneous, 4x Daily AC & at Bedtime  losartan, 100 mg, Oral, Daily  pantoprazole, 40 mg, Oral, Q AM  senna-docusate sodium, 2 tablet, Oral, BID  sodium chloride, 10 mL, Intravenous, Q12H  ticagrelor, 90 mg, Oral, BID      Continuous Infusions:     PRN Meds:.  acetaminophen    senna-docusate sodium **AND** polyethylene glycol **AND** bisacodyl **AND** bisacodyl    Calcium Replacement - Follow Nurse / BPA Driven Protocol    cyclobenzaprine    dextrose    dextrose    glucagon (human recombinant)    Magnesium Standard Dose Replacement - Follow Nurse / BPA Driven Protocol    melatonin    ondansetron    Phosphorus Replacement - Follow Nurse / BPA Driven Protocol    Potassium Replacement - Follow Nurse / BPA Driven Protocol    pregabalin    sodium chloride    sodium chloride    Assessment & Plan   Assessment & Plan     Active Hospital Problems    Diagnosis  POA    **CVA (cerebral vascular accident) [I63.9]  Unknown    Right hemiparesis [G81.91]  Unknown    Aphasia [R47.01]  Unknown    History of stroke [Z86.73]  Not Applicable    DM (diabetes mellitus) [E11.9]  Unknown    HTN (hypertension) [I10]  Unknown    Hyperlipidemia [E78.5]  Unknown    Stroke [I63.9]   Yes      Resolved Hospital Problems   No resolved problems to display.        Brief Hospital Course to date:  Alma Mederos is a 52 y.o. female w/ hx HTN, HL, DM2, previous CVA (w/ residual right sided weakness and dysarthria at baseline) ~ 1 year ago. Patient reports complianct with her asa and plavix. Patient presents from [facility] ED with worsening of her right sided weakness and aphasia w/ concern for new or worsening stroke. At [facility] EKG revealed sinus rhythm, baseline labs were unremarkable. U/a was benign. CT head revealed old left thalamic and left frontal CVA. CTA head and neck revealed possible high grade stenosis in distal petrous portion of the left ica, mild-moderate narrowing right ica. [facility] Stroke team contacted and arrangements made to transfer for higher level of care.     This patient's problems and plans were partially entered by my partner and updated as appropriate by me 06/07/23.        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    --have reviewed neurology recs with normalized BP goals, recommend brilitana/ASA x 3 weeks then brilinta monotherapy.   --could consider cerebral angiogram for worsening symptoms   --needs prn f/u in stroke clinic    DM2  -basal bolus insulin, A1C 10.8   -diabetes educator has seen     HypoK- resolved   -will put in replacement protocol     Retinal Detachment- history of   --noted on imaging, d/w patient. She has h/o left sided retinal detachment/hemorrhage and has had surgery at  approx 3 years ago. She  notes no new visual symptoms today. This is a chronic finding          DVT prophylaxis: sq lovenox       Expected Discharge Location and Transportation: needs rehab and is willing  Expected Discharge   Expected Discharge Date: 6/8/2023; Expected Discharge Time:      DVT prophylaxis:  Medical and mechanical DVT prophylaxis orders are present.     AM-PAC 6 Clicks Score (PT): 16 (06/06/23 2322)    CODE STATUS:   Code Status and Medical Interventions:   Ordered at: 06/05/23 0619     Code Status (Patient has no pulse and is not breathing):    CPR (Attempt to Resuscitate)     Medical Interventions (Patient has pulse or is breathing):    Full Support       Penelope Trevizo MD  06/07/23

## 2023-06-08 VITALS
RESPIRATION RATE: 16 BRPM | DIASTOLIC BLOOD PRESSURE: 72 MMHG | TEMPERATURE: 98.4 F | SYSTOLIC BLOOD PRESSURE: 124 MMHG | HEART RATE: 73 BPM | OXYGEN SATURATION: 97 %

## 2023-06-08 LAB
ALBUMIN SERPL-MCNC: 3.6 G/DL (ref 3.5–5.2)
ALBUMIN/GLOB SERPL: 1.3 G/DL
ALP SERPL-CCNC: 147 U/L (ref 39–117)
ALT SERPL W P-5'-P-CCNC: 19 U/L (ref 1–33)
ANION GAP SERPL CALCULATED.3IONS-SCNC: 13 MMOL/L (ref 5–15)
AST SERPL-CCNC: 18 U/L (ref 1–32)
BASOPHILS # BLD AUTO: 0.05 10*3/MM3 (ref 0–0.2)
BASOPHILS NFR BLD AUTO: 0.7 % (ref 0–1.5)
BILIRUB SERPL-MCNC: 0.2 MG/DL (ref 0–1.2)
BUN SERPL-MCNC: 18 MG/DL (ref 6–20)
BUN/CREAT SERPL: 24 (ref 7–25)
CALCIUM SPEC-SCNC: 9.4 MG/DL (ref 8.6–10.5)
CHLORIDE SERPL-SCNC: 103 MMOL/L (ref 98–107)
CO2 SERPL-SCNC: 23 MMOL/L (ref 22–29)
CREAT SERPL-MCNC: 0.75 MG/DL (ref 0.57–1)
DEPRECATED RDW RBC AUTO: 47.4 FL (ref 37–54)
EGFRCR SERPLBLD CKD-EPI 2021: 95.9 ML/MIN/1.73
EOSINOPHIL # BLD AUTO: 0.21 10*3/MM3 (ref 0–0.4)
EOSINOPHIL NFR BLD AUTO: 3 % (ref 0.3–6.2)
ERYTHROCYTE [DISTWIDTH] IN BLOOD BY AUTOMATED COUNT: 13.9 % (ref 12.3–15.4)
GLOBULIN UR ELPH-MCNC: 2.7 GM/DL
GLUCOSE BLDC GLUCOMTR-MCNC: 173 MG/DL (ref 70–130)
GLUCOSE BLDC GLUCOMTR-MCNC: 185 MG/DL (ref 70–130)
GLUCOSE SERPL-MCNC: 152 MG/DL (ref 65–99)
HCT VFR BLD AUTO: 37.7 % (ref 34–46.6)
HGB BLD-MCNC: 11.7 G/DL (ref 12–15.9)
IMM GRANULOCYTES # BLD AUTO: 0.04 10*3/MM3 (ref 0–0.05)
IMM GRANULOCYTES NFR BLD AUTO: 0.6 % (ref 0–0.5)
LYMPHOCYTES # BLD AUTO: 2.58 10*3/MM3 (ref 0.7–3.1)
LYMPHOCYTES NFR BLD AUTO: 37.2 % (ref 19.6–45.3)
MCH RBC QN AUTO: 28.5 PG (ref 26.6–33)
MCHC RBC AUTO-ENTMCNC: 31 G/DL (ref 31.5–35.7)
MCV RBC AUTO: 92 FL (ref 79–97)
MONOCYTES # BLD AUTO: 0.6 10*3/MM3 (ref 0.1–0.9)
MONOCYTES NFR BLD AUTO: 8.7 % (ref 5–12)
NEUTROPHILS NFR BLD AUTO: 3.45 10*3/MM3 (ref 1.7–7)
NEUTROPHILS NFR BLD AUTO: 49.8 % (ref 42.7–76)
NRBC BLD AUTO-RTO: 0 /100 WBC (ref 0–0.2)
PLATELET # BLD AUTO: 350 10*3/MM3 (ref 140–450)
PMV BLD AUTO: 10.4 FL (ref 6–12)
POTASSIUM SERPL-SCNC: 4.7 MMOL/L (ref 3.5–5.2)
PROT SERPL-MCNC: 6.3 G/DL (ref 6–8.5)
RBC # BLD AUTO: 4.1 10*6/MM3 (ref 3.77–5.28)
SODIUM SERPL-SCNC: 139 MMOL/L (ref 136–145)
WBC NRBC COR # BLD: 6.93 10*3/MM3 (ref 3.4–10.8)

## 2023-06-08 PROCEDURE — G0378 HOSPITAL OBSERVATION PER HR: HCPCS

## 2023-06-08 PROCEDURE — 25010000002 ENOXAPARIN PER 10 MG: Performed by: INTERNAL MEDICINE

## 2023-06-08 PROCEDURE — 85025 COMPLETE CBC W/AUTO DIFF WBC: CPT | Performed by: INTERNAL MEDICINE

## 2023-06-08 PROCEDURE — 92507 TX SP LANG VOICE COMM INDIV: CPT

## 2023-06-08 PROCEDURE — 99239 HOSP IP/OBS DSCHRG MGMT >30: CPT | Performed by: NURSE PRACTITIONER

## 2023-06-08 PROCEDURE — 82948 REAGENT STRIP/BLOOD GLUCOSE: CPT

## 2023-06-08 PROCEDURE — 96372 THER/PROPH/DIAG INJ SC/IM: CPT

## 2023-06-08 PROCEDURE — 80053 COMPREHEN METABOLIC PANEL: CPT | Performed by: INTERNAL MEDICINE

## 2023-06-08 PROCEDURE — 63710000001 INSULIN DETEMIR PER 5 UNITS: Performed by: INTERNAL MEDICINE

## 2023-06-08 PROCEDURE — 63710000001 INSULIN LISPRO (HUMAN) PER 5 UNITS: Performed by: INTERNAL MEDICINE

## 2023-06-08 RX ORDER — PANTOPRAZOLE SODIUM 40 MG/1
40 TABLET, DELAYED RELEASE ORAL
Start: 2023-06-09

## 2023-06-08 RX ORDER — ASPIRIN 81 MG/1
81 TABLET, CHEWABLE ORAL DAILY
Qty: 21 TABLET | Refills: 0 | Status: SHIPPED | OUTPATIENT
Start: 2023-06-08 | End: 2023-06-29

## 2023-06-08 RX ORDER — PREGABALIN 100 MG/1
300 CAPSULE ORAL
Qty: 9 CAPSULE | Refills: 0 | Status: SHIPPED | OUTPATIENT
Start: 2023-06-08 | End: 2023-06-11

## 2023-06-08 RX ORDER — CYCLOBENZAPRINE HCL 10 MG
10 TABLET ORAL DAILY PRN
Qty: 3 TABLET | Refills: 0 | Status: SHIPPED | OUTPATIENT
Start: 2023-06-08 | End: 2023-06-11

## 2023-06-08 RX ADMIN — INSULIN LISPRO 2 UNITS: 100 INJECTION, SOLUTION INTRAVENOUS; SUBCUTANEOUS at 14:11

## 2023-06-08 RX ADMIN — ASPIRIN 81 MG CHEWABLE TABLET 81 MG: 81 TABLET CHEWABLE at 09:38

## 2023-06-08 RX ADMIN — TICAGRELOR 90 MG: 90 TABLET ORAL at 09:38

## 2023-06-08 RX ADMIN — INSULIN DETEMIR 10 UNITS: 100 INJECTION, SOLUTION SUBCUTANEOUS at 09:39

## 2023-06-08 RX ADMIN — Medication 10 ML: at 09:38

## 2023-06-08 RX ADMIN — LOSARTAN POTASSIUM 100 MG: 50 TABLET, FILM COATED ORAL at 09:38

## 2023-06-08 RX ADMIN — EMPAGLIFLOZIN 10 MG: 10 TABLET, FILM COATED ORAL at 09:38

## 2023-06-08 RX ADMIN — BUPROPION HYDROCHLORIDE 150 MG: 150 TABLET, EXTENDED RELEASE ORAL at 09:38

## 2023-06-08 RX ADMIN — PANTOPRAZOLE SODIUM 40 MG: 40 TABLET, DELAYED RELEASE ORAL at 06:10

## 2023-06-08 RX ADMIN — INSULIN LISPRO 2 UNITS: 100 INJECTION, SOLUTION INTRAVENOUS; SUBCUTANEOUS at 09:39

## 2023-06-08 RX ADMIN — ENOXAPARIN SODIUM 40 MG: 100 INJECTION SUBCUTANEOUS at 09:38

## 2023-06-08 NOTE — CASE MANAGEMENT/SOCIAL WORK
Case Management Discharge Note      Final Note: Patient has a bed offer at [facility]. She has accepted the offer. She has made arrangements for her significant other, Perfecto, to transport her there around 1500 today. Please call report to [phone]. CM to fax discharge summary to [phone]. Per Doreen, no covid test required. The pharmacy in [facility] reflects the pharmacy for [facility]. Patient denied any further needs from .         Selected Continued Care - Admitted Since 6/5/2023       Destination Coordination complete.      Service Provider Selected Services Address Phone Fax Patient Preferred    [facility] [address] [place] [address] [phone] --              Durable Medical Equipment    No services have been selected for the patient.                Dialysis/Infusion    No services have been selected for the patient.                Home Medical Care    No services have been selected for the patient.                Therapy    No services have been selected for the patient.                Community Resources    No services have been selected for the patient.                Community & DME    No services have been selected for the patient.                    Transportation Services  Private: Car    Final Discharge Disposition Code: 03 - skilled nursing facility (SNF)

## 2023-06-08 NOTE — DISCHARGE PLACEMENT REQUEST
"Alma Mederos (52 y.o. Female)       Date of Birth   1970    Social Security Number       Address   [address] [address]    Home Phone   [phone]    MRN   [id number]       [place]   Unknown    Marital Status   Single                            Admission Date   23    Admission Type   Urgent    Admitting Provider   Sherry Luis MD    Attending Provider   Sherry Luis MD    Department, Room/Bed   Psychiatric 3F, S305/1       Discharge Date       Discharge Disposition   Rehab Facility or Unit (DC - External)    Discharge Destination                                 Attending Provider: Sherry Luis MD    Allergies: Insulin Nph (Human) (Isophane), Yellow Jacket Venom    Isolation: None   Infection: None   Code Status: CPR    Ht: 165.1 cm (65\")   Wt: 81.6 kg (180 lb)    Admission Cmt: None   Principal Problem: CVA (cerebral vascular accident) [I63.9]                   Active Insurance as of 2023       Primary Coverage       Payor Plan Insurance Group Employer/Plan Group    HUMANA MEDICARE REPLACEMENT HUMANA MEDICARE REPLACEMENT [id number]       Payor Plan Address Payor Plan Phone Number Payor Plan Fax Number Effective Dates    [address] [address] [phone]  3/1/2023 - None Entered    [facility] [id number]         Subscriber Name Subscriber Birth Date Member ID       ALMA MEDEROS 1970 [id number]                     Emergency Contacts        (Rel.) Home Phone Work Phone Mobile Phone    ELLA CURRY (Other) [phone] -- [phone]                 Discharge Summary        Noreen Newman APRN at [facility]              [facility] Medicine Services  DISCHARGE SUMMARY    Patient Name: Alma Mederos  : 1970  MRN: [id number]    Date of Admission: 2023  5:44 AM  Date of Discharge:  2023  Primary Care Physician: Alyce Sanchez APRN    Consults       No orders found from 2023 to " 6/6/2023.            Hospital Course     Presenting Problem: right sided weakness     Active Hospital Problems    Diagnosis  POA    **CVA (cerebral vascular accident) [I63.9]  Unknown    Right hemiparesis [G81.91]  Unknown    Aphasia [R47.01]  Unknown    History of stroke [Z86.73]  Not Applicable    DM (diabetes mellitus) [E11.9]  Unknown    HTN (hypertension) [I10]  Unknown    Hyperlipidemia [E78.5]  Unknown    Stroke [I63.9]  Yes      Resolved Hospital Problems   No resolved problems to display.          Hospital Course:  Alma Mederos is a 52 y.o. female   w/ hx HTN, HL, DM2, previous CVA (w/ residual right sided weakness and dysarthria at baseline) ~ 1 year ago. Patient reports complianct with her asa and plavix. Patient presents from [facility] ED with worsening of her right sided weakness and aphasia w/ concern for new or worsening stroke. At [facility] EKG revealed sinus rhythm, baseline labs were unremarkable. U/a was benign. CT head revealed old left thalamic and left frontal CVA. CTA head and neck revealed possible high grade stenosis in distal petrous portion of the left ica, mild-moderate narrowing right ica. [facility] Stroke team contacted and arrangements made to transfer for higher level of care.            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    --have reviewed neurology recs with normalized BP goals, recommend brilitana/ASA x 3 weeks then brilinta monotherapy.   --could consider cerebral angiogram for worsening symptoms   --needs prn f/u in  stroke clinic     DM2  -basal bolus insulin, A1C 10.8   -diabetes educator has seen      HypoK- resolved   -replacement protocol      Retinal Detachment- history of   --noted on imaging, d/w patient. She has h/o left sided retinal detachment/hemorrhage and has had surgery at  approx 3 years ago. She notes no new visual symptoms today. This is a chronic finding             Discharge Follow Up Recommendations for outpatient labs/diagnostics:   PCP 1 week   Neurology 3 months     Day of Discharge     HPI:   Resting in bed, using iPad. Awaiting rehab today, spouse to transport. No pain. States she has had great care here at [facility]. Cont to have right sided weakness. No f/c, n/v/d, soa or cp     Review of Systems  All other systems negative     Vital Signs:   Temp:  [97.6 °F (36.4 °C)-98.4 °F (36.9 °C)] 98.4 °F (36.9 °C)  Heart Rate:  [64-77] 73  Resp:  [16-18] 16  BP: (109-134)/(57-75) 124/72      Physical Exam:  Constitutional: No acute distress, awake, alert  HENT: NCAT, mucous membranes moist  Respiratory: Clear to auscultation bilaterally, respiratory effort normal   Cardiovascular: RRR, no murmurs, rubs, or gallops  Gastrointestinal: Positive bowel sounds, soft, nontender, nondistended  Musculoskeletal: No bilateral ankle edema  Psychiatric: Appropriate affect, cooperative  Neurologic: Oriented x 3, right sided weakness, speech clear  Skin: No rashes     Pertinent  and/or Most Recent Results     LAB RESULTS:      Lab 06/08/23  0359 06/07/23  0934 06/06/23  0456 06/05/23  0647   WBC 6.93 6.56 6.60 7.54   HEMOGLOBIN 11.7* 11.6* 11.5* 11.6*   HEMATOCRIT 37.7 36.7 37.1 36.6   PLATELETS 350 376 341 318   NEUTROS ABS 3.45 4.22 3.48 3.73   IMMATURE GRANS (ABS) 0.04 0.02 0.02 0.02   LYMPHS ABS 2.58 1.66 2.40 2.97   MONOS ABS 0.60 0.47 0.52 0.58   EOS ABS 0.21 0.14 0.15 0.19   MCV 92.0 89.7 92.8 92.0         Lab 06/08/23  0359 06/07/23  0934 06/06/23  0456 06/05/23  2249 06/05/23  0647   SODIUM 139 136 139  --  139    POTASSIUM 4.7 4.5 5.0 4.4 3.0*   CHLORIDE 103 99 104  --  103   CO2 23.0 27.0 24.0  --  24.0   ANION GAP 13.0 10.0 11.0  --  12.0   BUN 18 16 11  --  15   CREATININE 0.75 0.76 0.75  --  0.57   EGFR 95.9 94.4 95.9  --  109.5   GLUCOSE 152* 257* 124*  --  118*   CALCIUM 9.4 9.1 8.6  --  9.0   MAGNESIUM  --   --   --   --  1.9   HEMOGLOBIN A1C  --   --   --   --  10.80*         Lab 06/08/23  0359 06/07/23  0934 06/06/23  0456 06/05/23  0647   TOTAL PROTEIN 6.3 6.9 6.0 6.6   ALBUMIN 3.6 3.7 3.4* 3.6   GLOBULIN 2.7 3.2 2.6 3.0   ALT (SGPT) 19 18 17 16   AST (SGOT) 18 16 18 17   BILIRUBIN 0.2 0.3 0.3 0.3   ALK PHOS 147* 143* 138* 138*             Lab 06/05/23  0647   CHOLESTEROL 138   LDL CHOL 62   HDL CHOL 62*   TRIGLYCERIDES 67             Brief Urine Lab Results       None          Microbiology Results (last 10 days)       ** No results found for the last 240 hours. **            Adult Transthoracic Echo Complete W/ Cont if Necessary Per Protocol (With Agitated Saline)    Result Date: 6/5/2023    Left ventricular systolic function is normal. Calculated left ventricular EF = 64%   Saline test results are negative.     MRI Brain Without Contrast    Result Date: 6/5/2023  MRI BRAIN WO CONTRAST Date of Exam: 6/5/2023 7:10 AM EDT Indication: Stroke, follow up.  Comparison: None available. Technique:  Routine multiplanar/multisequence sequence images of the brain were obtained without contrast administration. Findings: Mildly motion-degraded examination. Parenchyma: On diffusion-weighted images there is increased signal along the left corona radiata without ADC correlate hypointensity. There is associated FLAIR hyperintensity in this area and a small focus of associated encephalomalacia/cystic change. No  evidence of hemorrhage. Midline structures appear grossly intact. No midline shift or herniation. Mild parenchymal volume loss. Few scattered periventricular and subcortical FLAIR hyperintensities are nonspecific but  often associated with chronic small vessel ischemic changes. Ventricles and extra-axial spaces: Mildly prominent ventricles and sulci secondary to volume loss. No extra-axial fluid collections. Other: Normal calvarial marrow signal. Fluid within the left posterior orbit. Scattered paranasal sinus mucosal thickening. Trace bilateral mastoid effusions. Major intracranial flow voids appear intact.     Impression: Mildly motion-degraded exam. Increased signal on DWI without ADC correlate and with mild FLAIR hyperintensity in the left corona radiata most likely representing subacute infarct. Fluid seen within the left posterior globe concerning for retinal detachment and hemorrhage. Recommend correlation with ophthalmologic exam. Additional chronic findings as above. Findings discussed with stroke team by Dr. Jack Claudio via telephone on 6/5/2023 7:53 AM EDT. Electronically Signed: Jack Claudio  6/5/2023 8:03 AM EDT  Workstation ID: [id number]    CT Outside Films    Result Date: 6/5/2023  This procedure was auto-finalized with no dictation required.    CT Outside Films    Result Date: 6/5/2023  This procedure was auto-finalized with no dictation required.             Results for orders placed during the hospital encounter of 06/05/23    Adult Transthoracic Echo Complete W/ Cont if Necessary Per Protocol (With Agitated Saline)    Interpretation Summary    Left ventricular systolic function is normal. Calculated left ventricular EF = 64%    Saline test results are negative.        Discharge Details        Discharge Medications        New Medications        Instructions Start Date   pantoprazole 40 MG EC tablet  Commonly known as: PROTONIX  Replaces: omeprazole 20 MG capsule   40 mg, Oral, Every Early Morning   Start Date: June 9, 2023     ticagrelor 90 MG tablet tablet  Commonly known as: BRILINTA   90 mg, Oral, 2 Times Daily             Changes to Medications        Instructions Start Date   cyclobenzaprine 10 MG  tablet  Commonly known as: FLEXERIL  What changed:   when to take this  reasons to take this   10 mg, Oral, Daily PRN             Continue These Medications        Instructions Start Date   aspirin 81 MG chewable tablet   81 mg, Oral, Daily      atorvastatin 80 MG tablet  Commonly known as: LIPITOR   80 mg, Oral, Daily      buPROPion  MG 24 hr tablet  Commonly known as: WELLBUTRIN XL   150 mg, Oral, Daily      empagliflozin 10 MG tablet tablet  Commonly known as: JARDIANCE   Oral, Daily      insulin aspart 100 UNIT/ML solution pen-injector sc pen  Commonly known as: novoLOG FLEXPEN   5 Units, Subcutaneous, 3 Times Daily With Meals      losartan 50 MG tablet  Commonly known as: COZAAR   100 mg, Oral, Daily      ondansetron 4 MG tablet  Commonly known as: ZOFRAN   4 mg, Oral, Every 6 Hours PRN      pregabalin 100 MG capsule  Commonly known as: LYRICA   300 mg, Oral, Every Night at Bedtime      Tresiba 100 UNIT/ML solution injection  Generic drug: Insulin Degludec   60 Units, Subcutaneous, Every Night at Bedtime             Stop These Medications      clopidogrel 75 MG tablet  Commonly known as: PLAVIX     FLUoxetine 20 MG capsule  Commonly known as: PROzac     omeprazole 20 MG capsule  Commonly known as: priLOSEC  Replaced by: pantoprazole 40 MG EC tablet              Allergies   Allergen Reactions    Insulin Nph (Human) (Isophane) Swelling    Yellow Jacket Venom Hives         Discharge Disposition:  Rehab Facility or Unit (DC - External)    Diet:  Hospital:  Diet Order   Procedures    Diet: Cardiac Diets, Diabetic Diets; Healthy Heart (2-3 Na+); Consistent Carbohydrate; Texture: Soft to Chew (NDD 3); Soft to Chew: Whole Meat; Fluid Consistency: Thin (IDDSI 0)            CODE STATUS:    Code Status and Medical Interventions:   Ordered at: 06/05/23 0619     Code Status (Patient has no pulse and is not breathing):    CPR (Attempt to Resuscitate)     Medical Interventions (Patient has pulse or is breathing):     Full Support       Future Appointments   Date Time Provider Department Center   6/23/2023  9:15 AM CL[facility]SROOM 1 [facility]   9/8/2023  2:30 PM [facility]O STRO[facility]                 JOSEPH Basilio  06/08/23      Time Spent on Discharge:  I spent  40 minutes on this discharge activity which included: face-to-face encounter with the patient, reviewing the data in the system, coordination of the care with the nursing staff as well as consultants, documentation, and entering orders.            Electronically signed by Noreen Newman APRN at 06/08/23 0683

## 2023-06-08 NOTE — PLAN OF CARE
Goal Outcome Evaluation:  Plan of Care Reviewed With: patient, family            SLP treatment completed. Will continue to address cognitive-communication. Please see note for further details and recommendations.

## 2023-06-08 NOTE — THERAPY TREATMENT NOTE
Acute Care - Speech Language Pathology Treatment Note  [facility]     Patient Name: Alma Mederos  : 1970  MRN: [id number]  Today's Date: 2023               Admit Date: 2023     Visit Dx:    ICD-10-CM ICD-9-CM   1. Cerebrovascular accident (CVA), unspecified mechanism  I63.9 434.91   2. Cognitive communication deficit  R41.841 799.52   3. Type 2 diabetes mellitus without complication, unspecified whether long term insulin use  E11.9 250.00     Patient Active Problem List   Diagnosis    Right hemiparesis    Aphasia    History of stroke    CVA (cerebral vascular accident)    DM (diabetes mellitus)    HTN (hypertension)    Hyperlipidemia    Stroke     Past Medical History:   Diagnosis Date    Diabetes mellitus     Hypertension     Stroke      Past Surgical History:   Procedure Laterality Date    APPENDECTOMY         SLP Recommendation and Plan                 Anticipated Discharge Disposition (SLP): skilled nursing facility, anticipate therapy at next level of care (23 134)        Predicted Duration Therapy Intervention (Days): until discharge (23 134)     Daily Summary of Progress (SLP): progress toward functional goals as expected (23 134)           Treatment Assessment (SLP): continued, cognitive-linguistic disorder (23 134)  Treatment Assessment Comments (SLP): Pt getting ready to d/c to SNF. Discussed cont'd SLP services @ next level of care. Provided activities to begin working on, as well as reviewed handout of memory strategies. (23 134)  Plan for Continued Treatment (SLP): continue treatment per plan of care (23 134)  Plan of Care Reviewed With: patient, family (23 2606)      SLP EVALUATION (last 72 hours)       SLP SLC Evaluation       Row Name 23 046                   Communication Assessment/Intervention    Document Type therapy note (daily note)  -MP        Subjective Information no complaints  -MP        Patient Observations  cooperative;alert  -MP        Patient/Family/Caregiver Comments/Observations Family present  -MP        Patient Effort good  -MP           Pain    Additional Documentation Pain Scale: FACES Pre/Post-Treatment (Group)  -MP           Pain Scale: FACES Pre/Post-Treatment    Pain: FACES Scale, Pretreatment 0-->no hurt  -MP        Posttreatment Pain Rating 0-->no hurt  -MP           SLP Treatment Clinical Impressions    Treatment Assessment (SLP) continued;cognitive-linguistic disorder  -MP        Treatment Assessment Comments (SLP) Pt getting ready to d/c to SNF. Discussed cont'd SLP services @ next level of care. Provided activities to begin working on, as well as reviewed handout of memory strategies.  -MP        Daily Summary of Progress (SLP) progress toward functional goals as expected  -MP        Plan for Continued Treatment (SLP) continue treatment per plan of care  -MP        Care Plan Review care plan/treatment goals reviewed  -MP           Recommendations    Therapy Frequency (SLP SLC) 5 days per week  -MP        Predicted Duration Therapy Intervention (Days) until discharge  -MP        Anticipated Discharge Disposition (SLP) skilled nursing facility;anticipate therapy at next level of care  -MP                  User Key  (r) = Recorded By, (t) = Taken By, (c) = Cosigned By      Initials Name Effective Dates    MP Robert Dee, MS CCC-SLP 12/28/21 -                        EDUCATION  The patient has been educated in the following areas:     Cognitive Impairment Communication Impairment.           SLP GOALS       Row Name 06/08/23 3945             Patient will demonstrate functional cognitive-linguistic skills for return to discharge environment    [place] Independently  -MP      Time frame by discharge  -MP      Progress/Outcomes continuing progress toward goal  -MP         Memory Skills Goal 1 (SLP)    Improve Memory Skills Through Goal 1 (SLP) recalling unrelated word lists with an imposed  delay;use memory strategies;80%;with minimal cues (75-90%)  -MP      Time Frame (Memory Skills Goal 1, SLP) short term goal (STG)  -MP      Progress/Outcomes (Memory Skills Goal 1, SLP) continuing progress toward goal  -MP      Comment (Memory Skills Goal 1, SLP) Reviewed memory strategies w/ pt & provided handout  -MP         Organizational Skills Goal 1 (SLP)    Improve Thought Organization Through Goal 1 (SLP) completing a divergent naming task;abstract;90%;with minimal cues (75-90%)  -MP      Time Frame (Thought Organization Skills Goal 1, SLP) short term goal (STG)  -MP      Progress/Outcomes (Thought Organization Skills Goal 1, SLP) goal ongoing  -MP         Functional Math Skills Goal 1 (SLP)    Improve Functional Math Skills Through Goal 1 (SLP) complete word problems involving time;complete word problems involving money;90%;with minimal cues (75-90%)  -MP      Time Frame (Functional Math Skills Goal 1, SLP) short term goal (STG)  -MP      Progress/Outcomes (Functional Math Skills Goal 1, SLP) goal ongoing  -MP      Comment (Functional Math Skills Goal 1, SLP) Provided handout of word problems to begin working on, as about to d/c  -MP                User Key  (r) = Recorded By, (t) = Taken By, (c) = Cosigned By      Initials Name Provider Type    Robert Gallegos MS CCC-SLP Speech and Language Pathologist                            Time Calculation:      Time Calculation- SLP       Row Name 06/08/23 1410             Time Calculation- SLP    SLP Start Time 1345  -MP      SLP Received On 06/08/23  -MP         Untimed Charges    18141-MU Treatment/ST Modification Prosth Aug Alter  40  -MP         Total Minutes    Untimed Charges Total Minutes 40  -MP       Total Minutes 40  -MP                User Key  (r) = Recorded By, (t) = Taken By, (c) = Cosigned By      Initials Name Provider Type    Robert Gallegos MS CCC-SLP Speech and Language Pathologist                    Therapy Charges for Today        Code Description Service Date Service Provider Modifiers Qty    [id number]  ST TREATMENT SPEECH 3 6/8/2023 Robert Dee, MS CCC-SLP GN 1                       Robert Herrmann MS CCC-SLP  6/8/2023

## 2023-06-08 NOTE — DISCHARGE SUMMARY
[facility] Medicine Services  DISCHARGE SUMMARY    Patient Name: Alma Mederos  : 1970  MRN: [id number]    Date of Admission: 2023  5:44 AM  Date of Discharge:  2023  Primary Care Physician: Alyce Sanchez APRN    Consults       No orders found from 2023 to 2023.            Hospital Course     Presenting Problem: right sided weakness     Active Hospital Problems    Diagnosis  POA    **CVA (cerebral vascular accident) [I63.9]  Unknown    Right hemiparesis [G81.91]  Unknown    Aphasia [R47.01]  Unknown    History of stroke [Z86.73]  Not Applicable    DM (diabetes mellitus) [E11.9]  Unknown    HTN (hypertension) [I10]  Unknown    Hyperlipidemia [E78.5]  Unknown    Stroke [I63.9]  Yes      Resolved Hospital Problems   No resolved problems to display.          Hospital Course:  Alma Mederos is a 52 y.o. female   w/ hx HTN, HL, DM2, previous CVA (w/ residual right sided weakness and dysarthria at baseline) ~ 1 year ago. Patient reports complianct with her asa and plavix. Patient presents from [facility] ED with worsening of her right sided weakness and aphasia w/ concern for new or worsening stroke. At [facility] EKG revealed sinus rhythm, baseline labs were unremarkable. U/a was benign. CT head revealed old left thalamic and left frontal CVA. CTA head and neck revealed possible high grade stenosis in distal petrous portion of the left ica, mild-moderate narrowing right ica. [facility] Stroke team contacted and arrangements made to transfer for higher level of care.            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    --have reviewed neurology recs with normalized BP goals, recommend brilitana/ASA x 3 weeks then brilinta monotherapy.   --could consider cerebral angiogram for worsening symptoms   --needs prn f/u in stroke clinic     DM2  -basal bolus insulin, A1C 10.8   -diabetes educator has seen      HypoK- resolved   -replacement protocol      Retinal Detachment- history of   --noted on imaging, d/w patient. She has h/o left sided retinal detachment/hemorrhage and has had surgery at  approx 3 years ago. She notes no new visual symptoms today. This is a chronic finding             Discharge Follow Up Recommendations for outpatient labs/diagnostics:   PCP 1 week   Neurology 3 months     Day of Discharge     HPI:   Resting in bed, using iPad. Awaiting rehab today, spouse to transport. No pain. States she has had great care here at [facility]. Cont to have right sided weakness. No f/c, n/v/d, soa or cp     Review of Systems  All other systems negative     Vital Signs:   Temp:  [97.6 °F (36.4 °C)-98.4 °F (36.9 °C)] 98.4 °F (36.9 °C)  Heart Rate:  [64-77] 73  Resp:  [16-18] 16  BP: (109-134)/(57-75) 124/72      Physical Exam:  Constitutional: No acute distress, awake, alert  HENT: NCAT, mucous membranes moist  Respiratory: Clear to auscultation bilaterally, respiratory effort normal   Cardiovascular: RRR, no murmurs, rubs, or gallops  Gastrointestinal: Positive bowel sounds, soft, nontender, nondistended  Musculoskeletal: No bilateral ankle edema  Psychiatric: Appropriate affect, cooperative  Neurologic: Oriented x 3, right sided weakness, speech clear  Skin: No rashes     Pertinent  and/or Most Recent Results     LAB RESULTS:      Lab 06/08/23  0359 06/07/23  0934 06/06/23  0456 06/05/23  0647   WBC 6.93 6.56 6.60 7.54   HEMOGLOBIN 11.7* 11.6* 11.5* 11.6*   HEMATOCRIT 37.7 36.7 37.1 36.6   PLATELETS 350 376 341 318   NEUTROS ABS 3.45  4.22 3.48 3.73   IMMATURE GRANS (ABS) 0.04 0.02 0.02 0.02   LYMPHS ABS 2.58 1.66 2.40 2.97   MONOS ABS 0.60 0.47 0.52 0.58   EOS ABS 0.21 0.14 0.15 0.19   MCV 92.0 89.7 92.8 92.0         Lab 06/08/23  0359 06/07/23  0934 06/06/23 0456 06/05/23  2249 06/05/23  0647   SODIUM 139 136 139  --  139   POTASSIUM 4.7 4.5 5.0 4.4 3.0*   CHLORIDE 103 99 104  --  103   CO2 23.0 27.0 24.0  --  24.0   ANION GAP 13.0 10.0 11.0  --  12.0   BUN 18 16 11  --  15   CREATININE 0.75 0.76 0.75  --  0.57   EGFR 95.9 94.4 95.9  --  109.5   GLUCOSE 152* 257* 124*  --  118*   CALCIUM 9.4 9.1 8.6  --  9.0   MAGNESIUM  --   --   --   --  1.9   HEMOGLOBIN A1C  --   --   --   --  10.80*         Lab 06/08/23  0359 06/07/23  0934 06/06/23 0456 06/05/23  0647   TOTAL PROTEIN 6.3 6.9 6.0 6.6   ALBUMIN 3.6 3.7 3.4* 3.6   GLOBULIN 2.7 3.2 2.6 3.0   ALT (SGPT) 19 18 17 16   AST (SGOT) 18 16 18 17   BILIRUBIN 0.2 0.3 0.3 0.3   ALK PHOS 147* 143* 138* 138*             Lab 06/05/23  0647   CHOLESTEROL 138   LDL CHOL 62   HDL CHOL 62*   TRIGLYCERIDES 67             Brief Urine Lab Results       None          Microbiology Results (last 10 days)       ** No results found for the last 240 hours. **            Adult Transthoracic Echo Complete W/ Cont if Necessary Per Protocol (With Agitated Saline)    Result Date: 6/5/2023    Left ventricular systolic function is normal. Calculated left ventricular EF = 64%   Saline test results are negative.     MRI Brain Without Contrast    Result Date: 6/5/2023  MRI BRAIN WO CONTRAST Date of Exam: 6/5/2023 7:10 AM EDT Indication: Stroke, follow up.  Comparison: None available. Technique:  Routine multiplanar/multisequence sequence images of the brain were obtained without contrast administration. Findings: Mildly motion-degraded examination. Parenchyma: On diffusion-weighted images there is increased signal along the left corona radiata without ADC correlate hypointensity. There is associated FLAIR hyperintensity in  this area and a small focus of associated encephalomalacia/cystic change. No  evidence of hemorrhage. Midline structures appear grossly intact. No midline shift or herniation. Mild parenchymal volume loss. Few scattered periventricular and subcortical FLAIR hyperintensities are nonspecific but often associated with chronic small vessel ischemic changes. Ventricles and extra-axial spaces: Mildly prominent ventricles and sulci secondary to volume loss. No extra-axial fluid collections. Other: Normal calvarial marrow signal. Fluid within the left posterior orbit. Scattered paranasal sinus mucosal thickening. Trace bilateral mastoid effusions. Major intracranial flow voids appear intact.     Impression: Mildly motion-degraded exam. Increased signal on DWI without ADC correlate and with mild FLAIR hyperintensity in the left corona radiata most likely representing subacute infarct. Fluid seen within the left posterior globe concerning for retinal detachment and hemorrhage. Recommend correlation with ophthalmologic exam. Additional chronic findings as above. Findings discussed with stroke team by Dr. Jack Claudio via telephone on 6/5/2023 7:53 AM EDT. Electronically Signed: Jack Claudio  6/5/2023 8:03 AM EDT  Workstation ID: [id number]    CT Outside Films    Result Date: 6/5/2023  This procedure was auto-finalized with no dictation required.    CT Outside Films    Result Date: 6/5/2023  This procedure was auto-finalized with no dictation required.             Results for orders placed during the hospital encounter of 06/05/23    Adult Transthoracic Echo Complete W/ Cont if Necessary Per Protocol (With Agitated Saline)    Interpretation Summary    Left ventricular systolic function is normal. Calculated left ventricular EF = 64%    Saline test results are negative.        Discharge Details        Discharge Medications        New Medications        Instructions Start Date   pantoprazole 40 MG EC tablet  Commonly known  as: PROTONIX  Replaces: omeprazole 20 MG capsule   40 mg, Oral, Every Early Morning   Start Date: June 9, 2023     ticagrelor 90 MG tablet tablet  Commonly known as: BRILINTA   90 mg, Oral, 2 Times Daily             Changes to Medications        Instructions Start Date   cyclobenzaprine 10 MG tablet  Commonly known as: FLEXERIL  What changed:   when to take this  reasons to take this   10 mg, Oral, Daily PRN             Continue These Medications        Instructions Start Date   aspirin 81 MG chewable tablet   81 mg, Oral, Daily      atorvastatin 80 MG tablet  Commonly known as: LIPITOR   80 mg, Oral, Daily      buPROPion  MG 24 hr tablet  Commonly known as: WELLBUTRIN XL   150 mg, Oral, Daily      empagliflozin 10 MG tablet tablet  Commonly known as: JARDIANCE   Oral, Daily      insulin aspart 100 UNIT/ML solution pen-injector sc pen  Commonly known as: novoLOG FLEXPEN   5 Units, Subcutaneous, 3 Times Daily With Meals      losartan 50 MG tablet  Commonly known as: COZAAR   100 mg, Oral, Daily      ondansetron 4 MG tablet  Commonly known as: ZOFRAN   4 mg, Oral, Every 6 Hours PRN      pregabalin 100 MG capsule  Commonly known as: LYRICA   300 mg, Oral, Every Night at Bedtime      Tresiba 100 UNIT/ML solution injection  Generic drug: Insulin Degludec   60 Units, Subcutaneous, Every Night at Bedtime             Stop These Medications      clopidogrel 75 MG tablet  Commonly known as: PLAVIX     FLUoxetine 20 MG capsule  Commonly known as: PROzac     omeprazole 20 MG capsule  Commonly known as: priLOSEC  Replaced by: pantoprazole 40 MG EC tablet              Allergies   Allergen Reactions    Insulin Nph (Human) (Isophane) Swelling    Yellow Jacket Venom Hives         Discharge Disposition:  Rehab Facility or Unit (DC - External)    Diet:  Hospital:  Diet Order   Procedures    Diet: Cardiac Diets, Diabetic Diets; Healthy Heart (2-3 Na+); Consistent Carbohydrate; Texture: Soft to Chew (NDD 3); Soft to Chew: Whole  Meat; Fluid Consistency: Thin (IDDSI 0)            CODE STATUS:    Code Status and Medical Interventions:   Ordered at: 06/05/23 0619     Code Status (Patient has no pulse and is not breathing):    CPR (Attempt to Resuscitate)     Medical Interventions (Patient has pulse or is breathing):    Full Support       Future Appointments   Date Time Provider Department Center   6/23/2023  9:15 AM [facility]   9/8/2023  2:30 PM [facility]                 JOSEPH Basilio  06/08/23      Time Spent on Discharge:  I spent  40 minutes on this discharge activity which included: face-to-face encounter with the patient, reviewing the data in the system, coordination of the care with the nursing staff as well as consultants, documentation, and entering orders.

## 2023-11-13 ENCOUNTER — OUTSIDE FACILITY SERVICE (OUTPATIENT)
Dept: FAMILY MEDICINE CLINIC | Facility: CLINIC | Age: 53
End: 2023-11-13
Payer: MEDICARE

## 2025-08-05 ENCOUNTER — OFFICE VISIT (OUTPATIENT)
Dept: ENDOCRINOLOGY | Facility: CLINIC | Age: 55
End: 2025-08-05
Payer: MEDICARE

## 2025-08-05 VITALS
OXYGEN SATURATION: 98 % | HEIGHT: 65 IN | HEART RATE: 73 BPM | WEIGHT: 157.8 LBS | BODY MASS INDEX: 26.29 KG/M2 | SYSTOLIC BLOOD PRESSURE: 140 MMHG | DIASTOLIC BLOOD PRESSURE: 90 MMHG

## 2025-08-05 DIAGNOSIS — E11.49 DM (DIABETES MELLITUS), TYPE 2 WITH NEUROLOGICAL COMPLICATIONS: ICD-10-CM

## 2025-08-05 DIAGNOSIS — E78.5 HYPERLIPIDEMIA, UNSPECIFIED HYPERLIPIDEMIA TYPE: ICD-10-CM

## 2025-08-05 DIAGNOSIS — E10.3533 TYPE 1 DIABETES MELLITUS WITH BOTH EYES AFFECTED BY PROLIFERATIVE RETINOPATHY AND TRACTION RETINAL DETACHMENTS NOT INVOLVING MACULAE: Primary | ICD-10-CM

## 2025-08-05 PROBLEM — E10.3539: Status: ACTIVE | Noted: 2025-08-05

## 2025-08-05 LAB
EXPIRATION DATE: NORMAL
GLUCOSE BLDC GLUCOMTR-MCNC: 91 MG/DL (ref 70–130)
Lab: NORMAL

## 2025-08-05 RX ORDER — OMEPRAZOLE 40 MG/1
40 CAPSULE, DELAYED RELEASE ORAL DAILY
COMMUNITY

## 2025-08-05 RX ORDER — CYCLOBENZAPRINE HCL 10 MG
10 TABLET ORAL DAILY
COMMUNITY

## 2025-08-05 RX ORDER — INSULIN PMP CART,AUT,G6/7,CNTR
1 EACH SUBCUTANEOUS ONCE
Qty: 1 KIT | Refills: 0 | Status: SHIPPED | OUTPATIENT
Start: 2025-08-05 | End: 2025-08-05

## 2025-08-05 RX ORDER — INSULIN GLARGINE 100 [IU]/ML
60 INJECTION, SOLUTION SUBCUTANEOUS DAILY
COMMUNITY
Start: 2025-07-03